# Patient Record
Sex: MALE | Race: WHITE | NOT HISPANIC OR LATINO
[De-identification: names, ages, dates, MRNs, and addresses within clinical notes are randomized per-mention and may not be internally consistent; named-entity substitution may affect disease eponyms.]

---

## 2017-02-13 ENCOUNTER — CLINICAL ADVICE (OUTPATIENT)
Age: 82
End: 2017-02-13

## 2017-04-12 ENCOUNTER — INPATIENT (INPATIENT)
Facility: HOSPITAL | Age: 82
LOS: 1 days | Discharge: HOME | End: 2017-04-14
Attending: HOSPITALIST | Admitting: HOSPITALIST
Payer: MEDICARE

## 2017-04-12 VITALS
TEMPERATURE: 98 F | HEART RATE: 92 BPM | DIASTOLIC BLOOD PRESSURE: 81 MMHG | OXYGEN SATURATION: 100 % | SYSTOLIC BLOOD PRESSURE: 138 MMHG | RESPIRATION RATE: 14 BRPM

## 2017-04-12 DIAGNOSIS — K62.5 HEMORRHAGE OF ANUS AND RECTUM: ICD-10-CM

## 2017-04-12 LAB
ALBUMIN SERPL ELPH-MCNC: 4.2 G/DL — SIGNIFICANT CHANGE UP (ref 3.3–5)
ALP SERPL-CCNC: 89 U/L — SIGNIFICANT CHANGE UP (ref 40–120)
ALT FLD-CCNC: 14 U/L — SIGNIFICANT CHANGE UP (ref 4–41)
APTT BLD: 49.4 SEC — HIGH (ref 27.5–37.4)
AST SERPL-CCNC: 27 U/L — SIGNIFICANT CHANGE UP (ref 4–40)
BASOPHILS # BLD AUTO: 0.04 K/UL — SIGNIFICANT CHANGE UP (ref 0–0.2)
BASOPHILS NFR BLD AUTO: 0.3 % — SIGNIFICANT CHANGE UP (ref 0–2)
BILIRUB SERPL-MCNC: 1.4 MG/DL — HIGH (ref 0.2–1.2)
BLD GP AB SCN SERPL QL: NEGATIVE — SIGNIFICANT CHANGE UP
BUN SERPL-MCNC: 21 MG/DL — SIGNIFICANT CHANGE UP (ref 7–23)
CALCIUM SERPL-MCNC: 9 MG/DL — SIGNIFICANT CHANGE UP (ref 8.4–10.5)
CHLORIDE SERPL-SCNC: 99 MMOL/L — SIGNIFICANT CHANGE UP (ref 98–107)
CO2 SERPL-SCNC: 24 MMOL/L — SIGNIFICANT CHANGE UP (ref 22–31)
CREAT SERPL-MCNC: 1.07 MG/DL — SIGNIFICANT CHANGE UP (ref 0.5–1.3)
EOSINOPHIL # BLD AUTO: 0.36 K/UL — SIGNIFICANT CHANGE UP (ref 0–0.5)
EOSINOPHIL NFR BLD AUTO: 2.8 % — SIGNIFICANT CHANGE UP (ref 0–6)
GLUCOSE SERPL-MCNC: 103 MG/DL — HIGH (ref 70–99)
HCT VFR BLD CALC: 45.9 % — SIGNIFICANT CHANGE UP (ref 39–50)
HGB BLD-MCNC: 15.9 G/DL — SIGNIFICANT CHANGE UP (ref 13–17)
IMM GRANULOCYTES NFR BLD AUTO: 0.4 % — SIGNIFICANT CHANGE UP (ref 0–1.5)
INR BLD: 3.78 — HIGH (ref 0.88–1.17)
LYMPHOCYTES # BLD AUTO: 25.3 % — SIGNIFICANT CHANGE UP (ref 13–44)
LYMPHOCYTES # BLD AUTO: 3.27 K/UL — SIGNIFICANT CHANGE UP (ref 1–3.3)
MCHC RBC-ENTMCNC: 31.8 PG — SIGNIFICANT CHANGE UP (ref 27–34)
MCHC RBC-ENTMCNC: 34.6 % — SIGNIFICANT CHANGE UP (ref 32–36)
MCV RBC AUTO: 91.8 FL — SIGNIFICANT CHANGE UP (ref 80–100)
MONOCYTES # BLD AUTO: 1.04 K/UL — HIGH (ref 0–0.9)
MONOCYTES NFR BLD AUTO: 8 % — SIGNIFICANT CHANGE UP (ref 2–14)
NEUTROPHILS # BLD AUTO: 8.19 K/UL — HIGH (ref 1.8–7.4)
NEUTROPHILS NFR BLD AUTO: 63.2 % — SIGNIFICANT CHANGE UP (ref 43–77)
OB PNL STL: POSITIVE — SIGNIFICANT CHANGE UP
PLATELET # BLD AUTO: 223 K/UL — SIGNIFICANT CHANGE UP (ref 150–400)
PMV BLD: 11 FL — SIGNIFICANT CHANGE UP (ref 7–13)
POTASSIUM SERPL-MCNC: 4.8 MMOL/L — SIGNIFICANT CHANGE UP (ref 3.5–5.3)
POTASSIUM SERPL-SCNC: 4.8 MMOL/L — SIGNIFICANT CHANGE UP (ref 3.5–5.3)
PROT SERPL-MCNC: 7.1 G/DL — SIGNIFICANT CHANGE UP (ref 6–8.3)
PROTHROM AB SERPL-ACNC: 43.5 SEC — HIGH (ref 9.8–13.1)
RBC # BLD: 5 M/UL — SIGNIFICANT CHANGE UP (ref 4.2–5.8)
RBC # FLD: 13.7 % — SIGNIFICANT CHANGE UP (ref 10.3–14.5)
RH IG SCN BLD-IMP: POSITIVE — SIGNIFICANT CHANGE UP
SODIUM SERPL-SCNC: 140 MMOL/L — SIGNIFICANT CHANGE UP (ref 135–145)
WBC # BLD: 12.95 K/UL — HIGH (ref 3.8–10.5)
WBC # FLD AUTO: 12.95 K/UL — HIGH (ref 3.8–10.5)

## 2017-04-12 RX ORDER — FINASTERIDE 5 MG/1
1 TABLET, FILM COATED ORAL
Qty: 0 | Refills: 0 | COMMUNITY

## 2017-04-12 RX ORDER — FINASTERIDE 5 MG/1
5 TABLET, FILM COATED ORAL ONCE
Qty: 0 | Refills: 0 | Status: COMPLETED | OUTPATIENT
Start: 2017-04-12 | End: 2017-04-12

## 2017-04-12 RX ORDER — METOPROLOL TARTRATE 50 MG
50 TABLET ORAL ONCE
Qty: 0 | Refills: 0 | Status: COMPLETED | OUTPATIENT
Start: 2017-04-12 | End: 2017-04-12

## 2017-04-12 RX ORDER — TAMSULOSIN HYDROCHLORIDE 0.4 MG/1
0.4 CAPSULE ORAL ONCE
Qty: 0 | Refills: 0 | Status: COMPLETED | OUTPATIENT
Start: 2017-04-12 | End: 2017-04-12

## 2017-04-12 RX ORDER — METHENAMINE MANDELATE 1 G
1 TABLET ORAL AT BEDTIME
Qty: 0 | Refills: 0 | Status: DISCONTINUED | OUTPATIENT
Start: 2017-04-12 | End: 2017-04-14

## 2017-04-12 RX ORDER — SIMVASTATIN 20 MG/1
40 TABLET, FILM COATED ORAL ONCE
Qty: 0 | Refills: 0 | Status: COMPLETED | OUTPATIENT
Start: 2017-04-12 | End: 2017-04-12

## 2017-04-12 RX ADMIN — Medication 1 GRAM(S): at 23:37

## 2017-04-12 NOTE — ED PROVIDER NOTE - OBJECTIVE STATEMENT
82M on coumadin for Afib, HTN, HL, hemmorhoids,  c/o abdominal cramps today with several slightly soft bowel movements. Initially noted some blood on toilet paper. Later noted fresh blood about a cup full in toilet with soft stool x2 this evening. no rectal pain, no cp nosob, no light headedness. Review of Symptoms in all systems otherwise negative, except as indicated  Meds:  Coumadin alternating daily dose: 4mg then 2mg then 2mg then 4mg then 2 mg then 2mg  Toprol XL 50mg  Flomax 4mg  Proscar 5mg  Hiprex 1mg 82M on coumadin for Afib, HTN, HL, hemmorhoids,  c/o abdominal cramps today with several slightly soft bowel movements. Initially noted some blood on toilet paper. Later noted fresh blood about a cup full in toilet with soft stool x2 this evening. no rectal pain, no cp nosob, no light headedness. Review of Symptoms in all systems otherwise negative, except as indicated  Meds:  Coumadin alternating daily dose: 4mg then 2mg then 2mg then 4mg then 2 mg then 2mg  Toprol XL 50mg  Flomax 4mg  Proscar 5mg  Hiprex 1mg    PCP DR Ward (in [facility]- not affiliated with [facility]) 82M on coumadin for Afib, HTN, HL, hemmorhoids,  c/o abdominal cramps today with several slightly soft bowel movements. Initially noted some blood on toilet paper. Later noted fresh blood about a cup full in toilet with soft stool x2 this evening. no rectal pain, no cp nosob, no light headedness. Review of Symptoms in all systems otherwise negative, except as indicated  Meds:  Coumadin alternating daily dose: 4mg then 2mg then 2mg then 4mg then 2 mg then 2mg taken in AM  Toprol XL 50mg BID  Flomax 4mg BID  Proscar 5mg HS  Hiprex 1mg HS    PCP DR Ward (in [facility]- not affiliated with [facility])

## 2017-04-12 NOTE — ED PROVIDER NOTE - PMH
AF (Atrial Fibrillation)  x 4-5 years  Enlarged prostate    HTN (hypertension)    Hypercholesterolemia

## 2017-04-12 NOTE — ED PROVIDER NOTE - PHYSICAL EXAMINATION
rectal done with nurse as chaperone  + non bleeding external hemmorhoids  No masses. + smoothly enlarged prostate  + fresh red blood on glove

## 2017-04-12 NOTE — ED PROVIDER NOTE - MEDICAL DECISION MAKING DETAILS
Rectal bleeding in 82M on Coumadin for a fib. Hemodynamically stable with unremarkable exam except + fresh blood on rectal exam. Plan: Labs PT INR H/H EKG T&S

## 2017-04-13 ENCOUNTER — TRANSCRIPTION ENCOUNTER (OUTPATIENT)
Age: 82
End: 2017-04-13

## 2017-04-13 DIAGNOSIS — N40.0 BENIGN PROSTATIC HYPERPLASIA WITHOUT LOWER URINARY TRACT SYMPTOMS: ICD-10-CM

## 2017-04-13 DIAGNOSIS — I48.91 UNSPECIFIED ATRIAL FIBRILLATION: ICD-10-CM

## 2017-04-13 DIAGNOSIS — I10 ESSENTIAL (PRIMARY) HYPERTENSION: ICD-10-CM

## 2017-04-13 DIAGNOSIS — E78.00 PURE HYPERCHOLESTEROLEMIA, UNSPECIFIED: ICD-10-CM

## 2017-04-13 DIAGNOSIS — K62.5 HEMORRHAGE OF ANUS AND RECTUM: ICD-10-CM

## 2017-04-13 LAB
ALBUMIN SERPL ELPH-MCNC: 3.7 G/DL — SIGNIFICANT CHANGE UP (ref 3.3–5)
ALP SERPL-CCNC: 77 U/L — SIGNIFICANT CHANGE UP (ref 40–120)
ALT FLD-CCNC: 9 U/L — SIGNIFICANT CHANGE UP (ref 4–41)
APPEARANCE UR: CLEAR — SIGNIFICANT CHANGE UP
AST SERPL-CCNC: 17 U/L — SIGNIFICANT CHANGE UP (ref 4–40)
BACTERIA # UR AUTO: SIGNIFICANT CHANGE UP
BASOPHILS # BLD AUTO: 0.03 K/UL — SIGNIFICANT CHANGE UP (ref 0–0.2)
BASOPHILS NFR BLD AUTO: 0.3 % — SIGNIFICANT CHANGE UP (ref 0–2)
BILIRUB SERPL-MCNC: 1.4 MG/DL — HIGH (ref 0.2–1.2)
BILIRUB UR-MCNC: NEGATIVE — SIGNIFICANT CHANGE UP
BLOOD UR QL VISUAL: HIGH
BUN SERPL-MCNC: 18 MG/DL — SIGNIFICANT CHANGE UP (ref 7–23)
CALCIUM SERPL-MCNC: 8.8 MG/DL — SIGNIFICANT CHANGE UP (ref 8.4–10.5)
CHLORIDE SERPL-SCNC: 101 MMOL/L — SIGNIFICANT CHANGE UP (ref 98–107)
CO2 SERPL-SCNC: 25 MMOL/L — SIGNIFICANT CHANGE UP (ref 22–31)
COLOR SPEC: SIGNIFICANT CHANGE UP
CREAT SERPL-MCNC: 0.87 MG/DL — SIGNIFICANT CHANGE UP (ref 0.5–1.3)
EOSINOPHIL # BLD AUTO: 0.31 K/UL — SIGNIFICANT CHANGE UP (ref 0–0.5)
EOSINOPHIL NFR BLD AUTO: 2.9 % — SIGNIFICANT CHANGE UP (ref 0–6)
GLUCOSE SERPL-MCNC: 91 MG/DL — SIGNIFICANT CHANGE UP (ref 70–99)
GLUCOSE UR-MCNC: NEGATIVE — SIGNIFICANT CHANGE UP
HCT VFR BLD CALC: 45.1 % — SIGNIFICANT CHANGE UP (ref 39–50)
HGB BLD-MCNC: 15.3 G/DL — SIGNIFICANT CHANGE UP (ref 13–17)
IMM GRANULOCYTES NFR BLD AUTO: 0.6 % — SIGNIFICANT CHANGE UP (ref 0–1.5)
INR BLD: 3.71 — HIGH (ref 0.88–1.17)
KETONES UR-MCNC: NEGATIVE — SIGNIFICANT CHANGE UP
LEUKOCYTE ESTERASE UR-ACNC: HIGH
LYMPHOCYTES # BLD AUTO: 3.37 K/UL — HIGH (ref 1–3.3)
LYMPHOCYTES # BLD AUTO: 32.1 % — SIGNIFICANT CHANGE UP (ref 13–44)
MAGNESIUM SERPL-MCNC: 1.7 MG/DL — SIGNIFICANT CHANGE UP (ref 1.6–2.6)
MCHC RBC-ENTMCNC: 31 PG — SIGNIFICANT CHANGE UP (ref 27–34)
MCHC RBC-ENTMCNC: 33.9 % — SIGNIFICANT CHANGE UP (ref 32–36)
MCV RBC AUTO: 91.5 FL — SIGNIFICANT CHANGE UP (ref 80–100)
MONOCYTES # BLD AUTO: 1.12 K/UL — HIGH (ref 0–0.9)
MONOCYTES NFR BLD AUTO: 10.7 % — SIGNIFICANT CHANGE UP (ref 2–14)
MUCOUS THREADS # UR AUTO: SIGNIFICANT CHANGE UP
NEUTROPHILS # BLD AUTO: 5.62 K/UL — SIGNIFICANT CHANGE UP (ref 1.8–7.4)
NEUTROPHILS NFR BLD AUTO: 53.4 % — SIGNIFICANT CHANGE UP (ref 43–77)
NITRITE UR-MCNC: NEGATIVE — SIGNIFICANT CHANGE UP
NON-SQ EPI CELLS # UR AUTO: <1 — SIGNIFICANT CHANGE UP
PH UR: 6 — SIGNIFICANT CHANGE UP (ref 4.6–8)
PHOSPHATE SERPL-MCNC: 2.6 MG/DL — SIGNIFICANT CHANGE UP (ref 2.5–4.5)
PLATELET # BLD AUTO: 206 K/UL — SIGNIFICANT CHANGE UP (ref 150–400)
PMV BLD: 11 FL — SIGNIFICANT CHANGE UP (ref 7–13)
POTASSIUM SERPL-MCNC: 4.2 MMOL/L — SIGNIFICANT CHANGE UP (ref 3.5–5.3)
POTASSIUM SERPL-SCNC: 4.2 MMOL/L — SIGNIFICANT CHANGE UP (ref 3.5–5.3)
PROT SERPL-MCNC: 6.1 G/DL — SIGNIFICANT CHANGE UP (ref 6–8.3)
PROT UR-MCNC: 20 — SIGNIFICANT CHANGE UP
PROTHROM AB SERPL-ACNC: 42.7 SEC — HIGH (ref 9.8–13.1)
RBC # BLD: 4.93 M/UL — SIGNIFICANT CHANGE UP (ref 4.2–5.8)
RBC # FLD: 13.8 % — SIGNIFICANT CHANGE UP (ref 10.3–14.5)
RBC CASTS # UR COMP ASSIST: SIGNIFICANT CHANGE UP (ref 0–?)
SODIUM SERPL-SCNC: 140 MMOL/L — SIGNIFICANT CHANGE UP (ref 135–145)
SP GR SPEC: 1.01 — SIGNIFICANT CHANGE UP (ref 1–1.03)
SQUAMOUS # UR AUTO: SIGNIFICANT CHANGE UP
UROBILINOGEN FLD QL: NORMAL E.U. — SIGNIFICANT CHANGE UP (ref 0.1–0.2)
WBC # BLD: 10.51 K/UL — HIGH (ref 3.8–10.5)
WBC # FLD AUTO: 10.51 K/UL — HIGH (ref 3.8–10.5)
WBC CLUMPS #/AREA URNS HPF: PRESENT — HIGH (ref 0–?)
WBC UR QL: SIGNIFICANT CHANGE UP (ref 0–?)

## 2017-04-13 PROCEDURE — 99222 1ST HOSP IP/OBS MODERATE 55: CPT | Mod: GC

## 2017-04-13 PROCEDURE — 99223 1ST HOSP IP/OBS HIGH 75: CPT

## 2017-04-13 RX ORDER — ASCORBIC ACID 60 MG
500 TABLET,CHEWABLE ORAL AT BEDTIME
Qty: 0 | Refills: 0 | Status: DISCONTINUED | OUTPATIENT
Start: 2017-04-13 | End: 2017-04-14

## 2017-04-13 RX ORDER — METHENAMINE MANDELATE 1 G
1 TABLET ORAL
Qty: 0 | Refills: 0 | COMMUNITY

## 2017-04-13 RX ORDER — TAMSULOSIN HYDROCHLORIDE 0.4 MG/1
0.4 CAPSULE ORAL AT BEDTIME
Qty: 0 | Refills: 0 | Status: DISCONTINUED | OUTPATIENT
Start: 2017-04-13 | End: 2017-04-14

## 2017-04-13 RX ORDER — METOPROLOL TARTRATE 50 MG
50 TABLET ORAL DAILY
Qty: 0 | Refills: 0 | Status: DISCONTINUED | OUTPATIENT
Start: 2017-04-13 | End: 2017-04-14

## 2017-04-13 RX ORDER — PHYTONADIONE (VIT K1) 5 MG
5 TABLET ORAL ONCE
Qty: 0 | Refills: 0 | Status: DISCONTINUED | OUTPATIENT
Start: 2017-04-13 | End: 2017-04-13

## 2017-04-13 RX ORDER — ASCORBIC ACID 60 MG
500 TABLET,CHEWABLE ORAL DAILY
Qty: 0 | Refills: 0 | Status: DISCONTINUED | OUTPATIENT
Start: 2017-04-13 | End: 2017-04-13

## 2017-04-13 RX ORDER — METOPROLOL TARTRATE 50 MG
1 TABLET ORAL
Qty: 0 | Refills: 0 | COMMUNITY

## 2017-04-13 RX ORDER — SIMVASTATIN 20 MG/1
1 TABLET, FILM COATED ORAL
Qty: 0 | Refills: 0 | COMMUNITY

## 2017-04-13 RX ORDER — PHYTONADIONE (VIT K1) 5 MG
10 TABLET ORAL ONCE
Qty: 0 | Refills: 0 | Status: COMPLETED | OUTPATIENT
Start: 2017-04-13 | End: 2017-04-13

## 2017-04-13 RX ORDER — FINASTERIDE 5 MG/1
5 TABLET, FILM COATED ORAL DAILY
Qty: 0 | Refills: 0 | Status: DISCONTINUED | OUTPATIENT
Start: 2017-04-13 | End: 2017-04-14

## 2017-04-13 RX ORDER — TAMSULOSIN HYDROCHLORIDE 0.4 MG/1
1 CAPSULE ORAL
Qty: 0 | Refills: 0 | COMMUNITY

## 2017-04-13 RX ORDER — SOD SULF/SODIUM/NAHCO3/KCL/PEG
1000 SOLUTION, RECONSTITUTED, ORAL ORAL EVERY 4 HOURS
Qty: 0 | Refills: 0 | Status: COMPLETED | OUTPATIENT
Start: 2017-04-13 | End: 2017-04-13

## 2017-04-13 RX ORDER — ASCORBIC ACID 60 MG
1 TABLET,CHEWABLE ORAL
Qty: 0 | Refills: 0 | COMMUNITY

## 2017-04-13 RX ORDER — FINASTERIDE 5 MG/1
1 TABLET, FILM COATED ORAL
Qty: 0 | Refills: 0 | COMMUNITY

## 2017-04-13 RX ORDER — SIMVASTATIN 20 MG/1
40 TABLET, FILM COATED ORAL AT BEDTIME
Qty: 0 | Refills: 0 | Status: DISCONTINUED | OUTPATIENT
Start: 2017-04-13 | End: 2017-04-14

## 2017-04-13 RX ORDER — SODIUM CHLORIDE 9 MG/ML
1000 INJECTION INTRAMUSCULAR; INTRAVENOUS; SUBCUTANEOUS
Qty: 0 | Refills: 0 | Status: DISCONTINUED | OUTPATIENT
Start: 2017-04-13 | End: 2017-04-14

## 2017-04-13 RX ADMIN — Medication 1 GRAM(S): at 20:49

## 2017-04-13 RX ADMIN — Medication 50 MILLIGRAM(S): at 07:01

## 2017-04-13 RX ADMIN — Medication 10 MILLIGRAM(S): at 18:27

## 2017-04-13 RX ADMIN — TAMSULOSIN HYDROCHLORIDE 0.4 MILLIGRAM(S): 0.4 CAPSULE ORAL at 09:22

## 2017-04-13 RX ADMIN — SODIUM CHLORIDE 100 MILLILITER(S): 9 INJECTION INTRAMUSCULAR; INTRAVENOUS; SUBCUTANEOUS at 20:50

## 2017-04-13 RX ADMIN — Medication 1000 MILLILITER(S): at 22:11

## 2017-04-13 RX ADMIN — Medication 10 MILLIGRAM(S): at 22:09

## 2017-04-13 RX ADMIN — SIMVASTATIN 40 MILLIGRAM(S): 20 TABLET, FILM COATED ORAL at 00:47

## 2017-04-13 RX ADMIN — FINASTERIDE 5 MILLIGRAM(S): 5 TABLET, FILM COATED ORAL at 22:10

## 2017-04-13 RX ADMIN — Medication 1000 MILLILITER(S): at 18:27

## 2017-04-13 RX ADMIN — Medication 50 MILLIGRAM(S): at 00:47

## 2017-04-13 RX ADMIN — FINASTERIDE 5 MILLIGRAM(S): 5 TABLET, FILM COATED ORAL at 00:47

## 2017-04-13 RX ADMIN — Medication 500 MILLIGRAM(S): at 20:51

## 2017-04-13 RX ADMIN — Medication 10 MILLIGRAM(S): at 14:19

## 2017-04-13 RX ADMIN — SODIUM CHLORIDE 100 MILLILITER(S): 9 INJECTION INTRAMUSCULAR; INTRAVENOUS; SUBCUTANEOUS at 04:17

## 2017-04-13 RX ADMIN — SIMVASTATIN 40 MILLIGRAM(S): 20 TABLET, FILM COATED ORAL at 22:10

## 2017-04-13 RX ADMIN — TAMSULOSIN HYDROCHLORIDE 0.4 MILLIGRAM(S): 0.4 CAPSULE ORAL at 00:47

## 2017-04-13 NOTE — PHYSICAL THERAPY INITIAL EVALUATION ADULT - PERTINENT HX OF CURRENT PROBLEM, REHAB EVAL
81 yo M with h/o HTN, A fib on coumadin, HLD p/w BRBPR. Pt states that today he had two episodes of BBPR. He says he had the sensation of " cramping or gas" and when attempting a bowel movement, noticed blood- without any stool

## 2017-04-13 NOTE — DISCHARGE NOTE ADULT - CARE PLAN
Principal Discharge DX:	Rectal bleeding  Goal:	no further bleeding, stable H/H  Secondary Diagnosis:	Atrial fibrillation, unspecified type  Goal:	rate control  Secondary Diagnosis:	HTN (hypertension)  Goal:	BP control Principal Discharge DX:	Rectal bleeding  Goal:	no further bleeding, stable H/H  Instructions for follow-up, activity and diet:	Colonoscopy showed Colonoscopy showed Non-thrombosed external hemorrhoids found on perianal exam with superficial ulcer. Diverticulosis in the entire examined colon- severe and extensive pancolonic diverticulosis is noted. Multiple non-bleeding polyps in the rectum, in the transverse colon, in the ascending colon and in the cecum- largest polyps are a 20mm x 6mm cecal polyp and a 10mm TC polyp. All non-bleeding and benign appearing. No active bleeding   You will need to Repeat colonoscopy in 6 months off of anticoagulation for removal of multiple polyps.  Secondary Diagnosis:	Atrial fibrillation, unspecified type  Goal:	rate control  Instructions for follow-up, activity and diet:	INR on discharge was 2.07, please continue daily Coumadin and follow up with your PCP in 3-4 days to repeat INR  Secondary Diagnosis:	HTN (hypertension)  Goal:	BP control Principal Discharge DX:	Rectal bleeding  Goal:	no further bleeding, stable H/H  Instructions for follow-up, activity and diet:	Colonoscopy showed Colonoscopy showed Non-thrombosed external hemorrhoids found on perianal exam with superficial ulcer. Diverticulosis in the entire examined colon- severe and extensive pancolonic diverticulosis is noted. Multiple non-bleeding polyps in the rectum, in the transverse colon, in the ascending colon and in the cecum- largest polyps are a 20mm x 6mm cecal polyp and a 10mm TC polyp. All non-bleeding and benign appearing. No active bleeding   You will need to Repeat colonoscopy in 6 months off of anticoagulation for removal of multiple polyps.  Secondary Diagnosis:	Atrial fibrillation, unspecified type  Goal:	rate control  Instructions for follow-up, activity and diet:	INR on discharge was 2.07, please continue daily Coumadin and follow up with your PCP in 3-4 days to repeat INR  Secondary Diagnosis:	HTN (hypertension)  Goal:	BP control  Instructions for follow-up, activity and diet:	Continue Metoprolol

## 2017-04-13 NOTE — DISCHARGE NOTE ADULT - PLAN OF CARE
no further bleeding, stable H/H rate control BP control Colonoscopy showed Colonoscopy showed Non-thrombosed external hemorrhoids found on perianal exam with superficial ulcer. Diverticulosis in the entire examined colon- severe and extensive pancolonic diverticulosis is noted. Multiple non-bleeding polyps in the rectum, in the transverse colon, in the ascending colon and in the cecum- largest polyps are a 20mm x 6mm cecal polyp and a 10mm TC polyp. All non-bleeding and benign appearing. No active bleeding   You will need to Repeat colonoscopy in 6 months off of anticoagulation for removal of multiple polyps. INR on discharge was 2.07, please continue daily Coumadin and follow up with your PCP in 3-4 days to repeat INR Continue Metoprolol

## 2017-04-13 NOTE — DISCHARGE NOTE ADULT - CARE PROVIDER_API CALL
Robert Carbajal (MD), Cardiac Electrophysiology; Cardiovascular Disease; Internal Medicine  [address] [address] [place] [id number]  Phone: [phone]  Fax: [phone]

## 2017-04-13 NOTE — DISCHARGE NOTE ADULT - HOSPITAL COURSE
81 yo M with h/o HTN, A fib on coumadin, HLD p/w BRBPR. Pt states that today he had two episodes of BBPR. He says he had the sensation of " cramping or gas" and when attempting a bowel movement, noticed blood- without any stool. CBC done Q6 h, H/H remained stable 83 yo M with h/o HTN, A fib on coumadin, HLD p/w BRBPR. Pt states that today he had two episodes of BBPR. He says he had the sensation of " cramping or gas" and when attempting a bowel movement, noticed blood- without any stool. CBC done Q6 h, H/H remained stable. Colonoscopy showed Non-thrombosed external hemorrhoids found on perianal exam with superficial ulcer. Diverticulosis in the entire examined colon- severe   and extensive pancolonic diverticulosis is noted. Multiple non-bleeding polyps in the rectum, in the transverse colon, in the ascending colon and in the cecum- largest polyps are a 20mm x 6mm cecal polyp and a 10mm TC polyp. All non-bleeding and benign appearing. No active bleeding or old blood was noted on this exam. Hematochezia was most likely secondary to hemorrhoids. Pt will need to Repeat colonoscopy in 6 months off of anticoagulation for removal of multiple polyps. 83 yo M with h/o HTN, A fib on coumadin, HLD p/w BRBPR. Pt states that today he had two episodes of BBPR. He says he had the sensation of " cramping or gas" and when attempting a bowel movement, noticed blood- without any stool. CBC done Q6 h, H/H remained stable. Colonoscopy showed Non-thrombosed external hemorrhoids found on perianal exam with superficial ulcer. Diverticulosis in the entire examined colon- severe   and extensive pancolonic diverticulosis is noted. Multiple non-bleeding polyps in the rectum, in the transverse colon, in the ascending colon and in the cecum- largest polyps are a 20mm x 6mm cecal polyp and a 10mm TC polyp. All non-bleeding and benign appearing. No active bleeding or old blood was noted on this exam. Hematochezia was most likely secondary to hemorrhoids. Pt will need to Repeat colonoscopy in 6 months off of anticoagulation for removal of multiple polyps.  Pt is optimized for discharge

## 2017-04-13 NOTE — H&P ADULT. - HISTORY OF PRESENT ILLNESS
83 yo M with h/o HTN, A fib on coumadin, HLD p/w BRBPR. Pt states that today he had two episodes of BBPR. He says he had the sensation of " cramping or gas" and when attempting a bowel movement, noticed blood- without any stool . He's unable to quantify the amount of blood. He denies any lightheadedness, he has not had any N/V, has not had any further BMs since then.      Initial ED VS: 138/81  97.7  14  100% RA

## 2017-04-13 NOTE — DISCHARGE NOTE ADULT - PATIENT PORTAL LINK FT
“You can access the [facility] Patient Portal, offered by [facility], by registering with the following website: [email or web address]”

## 2017-04-14 VITALS
OXYGEN SATURATION: 99 % | HEART RATE: 63 BPM | RESPIRATION RATE: 19 BRPM | TEMPERATURE: 98 F | SYSTOLIC BLOOD PRESSURE: 155 MMHG | DIASTOLIC BLOOD PRESSURE: 90 MMHG

## 2017-04-14 LAB
ALBUMIN SERPL ELPH-MCNC: 3.6 G/DL — SIGNIFICANT CHANGE UP (ref 3.3–5)
ALP SERPL-CCNC: 69 U/L — SIGNIFICANT CHANGE UP (ref 40–120)
ALT FLD-CCNC: 11 U/L — SIGNIFICANT CHANGE UP (ref 4–41)
APTT BLD: 39.7 SEC — HIGH (ref 27.5–37.4)
AST SERPL-CCNC: 18 U/L — SIGNIFICANT CHANGE UP (ref 4–40)
BACTERIA UR CULT: SIGNIFICANT CHANGE UP
BASOPHILS # BLD AUTO: 0.02 K/UL — SIGNIFICANT CHANGE UP (ref 0–0.2)
BASOPHILS NFR BLD AUTO: 0.2 % — SIGNIFICANT CHANGE UP (ref 0–2)
BILIRUB SERPL-MCNC: 1.5 MG/DL — HIGH (ref 0.2–1.2)
BUN SERPL-MCNC: 13 MG/DL — SIGNIFICANT CHANGE UP (ref 7–23)
CALCIUM SERPL-MCNC: 8.4 MG/DL — SIGNIFICANT CHANGE UP (ref 8.4–10.5)
CHLORIDE SERPL-SCNC: 111 MMOL/L — HIGH (ref 98–107)
CO2 SERPL-SCNC: 19 MMOL/L — LOW (ref 22–31)
CREAT SERPL-MCNC: 0.91 MG/DL — SIGNIFICANT CHANGE UP (ref 0.5–1.3)
EOSINOPHIL # BLD AUTO: 0.32 K/UL — SIGNIFICANT CHANGE UP (ref 0–0.5)
EOSINOPHIL NFR BLD AUTO: 3.9 % — SIGNIFICANT CHANGE UP (ref 0–6)
GLUCOSE SERPL-MCNC: 90 MG/DL — SIGNIFICANT CHANGE UP (ref 70–99)
HCT VFR BLD CALC: 44.4 % — SIGNIFICANT CHANGE UP (ref 39–50)
HGB BLD-MCNC: 14.9 G/DL — SIGNIFICANT CHANGE UP (ref 13–17)
IMM GRANULOCYTES NFR BLD AUTO: 0.9 % — SIGNIFICANT CHANGE UP (ref 0–1.5)
INR BLD: 2.07 — HIGH (ref 0.88–1.17)
LYMPHOCYTES # BLD AUTO: 2.66 K/UL — SIGNIFICANT CHANGE UP (ref 1–3.3)
LYMPHOCYTES # BLD AUTO: 32.5 % — SIGNIFICANT CHANGE UP (ref 13–44)
MCHC RBC-ENTMCNC: 31 PG — SIGNIFICANT CHANGE UP (ref 27–34)
MCHC RBC-ENTMCNC: 33.6 % — SIGNIFICANT CHANGE UP (ref 32–36)
MCV RBC AUTO: 92.5 FL — SIGNIFICANT CHANGE UP (ref 80–100)
MONOCYTES # BLD AUTO: 0.93 K/UL — HIGH (ref 0–0.9)
MONOCYTES NFR BLD AUTO: 11.4 % — SIGNIFICANT CHANGE UP (ref 2–14)
NEUTROPHILS # BLD AUTO: 4.18 K/UL — SIGNIFICANT CHANGE UP (ref 1.8–7.4)
NEUTROPHILS NFR BLD AUTO: 51.1 % — SIGNIFICANT CHANGE UP (ref 43–77)
PLATELET # BLD AUTO: 180 K/UL — SIGNIFICANT CHANGE UP (ref 150–400)
PMV BLD: 10.6 FL — SIGNIFICANT CHANGE UP (ref 7–13)
POTASSIUM SERPL-MCNC: 4.3 MMOL/L — SIGNIFICANT CHANGE UP (ref 3.5–5.3)
POTASSIUM SERPL-SCNC: 4.3 MMOL/L — SIGNIFICANT CHANGE UP (ref 3.5–5.3)
PROT SERPL-MCNC: 5.9 G/DL — LOW (ref 6–8.3)
PROTHROM AB SERPL-ACNC: 23.5 SEC — HIGH (ref 9.8–13.1)
RBC # BLD: 4.8 M/UL — SIGNIFICANT CHANGE UP (ref 4.2–5.8)
RBC # FLD: 13.7 % — SIGNIFICANT CHANGE UP (ref 10.3–14.5)
SODIUM SERPL-SCNC: 145 MMOL/L — SIGNIFICANT CHANGE UP (ref 135–145)
SPECIMEN SOURCE: SIGNIFICANT CHANGE UP
WBC # BLD: 8.18 K/UL — SIGNIFICANT CHANGE UP (ref 3.8–10.5)
WBC # FLD AUTO: 8.18 K/UL — SIGNIFICANT CHANGE UP (ref 3.8–10.5)

## 2017-04-14 PROCEDURE — 45378 DIAGNOSTIC COLONOSCOPY: CPT | Mod: GC

## 2017-04-14 PROCEDURE — 99239 HOSP IP/OBS DSCHRG MGMT >30: CPT

## 2017-04-14 RX ORDER — WARFARIN SODIUM 2.5 MG/1
1 TABLET ORAL
Qty: 0 | Refills: 0 | COMMUNITY

## 2017-04-14 RX ADMIN — Medication 50 MILLIGRAM(S): at 05:49

## 2017-04-18 ENCOUNTER — APPOINTMENT (OUTPATIENT)
Dept: ELECTROPHYSIOLOGY | Facility: CLINIC | Age: 82
End: 2017-04-18

## 2017-04-18 ENCOUNTER — NON-APPOINTMENT (OUTPATIENT)
Age: 82
End: 2017-04-18

## 2017-04-18 VITALS
HEIGHT: 72 IN | DIASTOLIC BLOOD PRESSURE: 89 MMHG | OXYGEN SATURATION: 98 % | SYSTOLIC BLOOD PRESSURE: 153 MMHG | RESPIRATION RATE: 16 BRPM | BODY MASS INDEX: 21.13 KG/M2 | HEART RATE: 89 BPM | WEIGHT: 156 LBS

## 2017-07-05 ENCOUNTER — APPOINTMENT (OUTPATIENT)
Dept: GASTROENTEROLOGY | Facility: CLINIC | Age: 82
End: 2017-07-05

## 2017-07-05 VITALS
SYSTOLIC BLOOD PRESSURE: 146 MMHG | HEIGHT: 72 IN | TEMPERATURE: 97.7 F | HEART RATE: 74 BPM | DIASTOLIC BLOOD PRESSURE: 82 MMHG | WEIGHT: 154 LBS | OXYGEN SATURATION: 96 % | RESPIRATION RATE: 16 BRPM | BODY MASS INDEX: 20.86 KG/M2

## 2017-09-05 ENCOUNTER — OTHER (OUTPATIENT)
Age: 82
End: 2017-09-05

## 2017-10-03 ENCOUNTER — APPOINTMENT (OUTPATIENT)
Dept: ELECTROPHYSIOLOGY | Facility: CLINIC | Age: 82
End: 2017-10-03
Payer: MEDICARE

## 2017-10-03 VITALS
HEIGHT: 72 IN | WEIGHT: 153 LBS | SYSTOLIC BLOOD PRESSURE: 134 MMHG | BODY MASS INDEX: 20.72 KG/M2 | OXYGEN SATURATION: 95 % | HEART RATE: 76 BPM | DIASTOLIC BLOOD PRESSURE: 86 MMHG

## 2017-10-03 DIAGNOSIS — K63.5 POLYP OF COLON: ICD-10-CM

## 2017-10-03 PROCEDURE — 99214 OFFICE O/P EST MOD 30 MIN: CPT

## 2017-10-03 PROCEDURE — 93000 ELECTROCARDIOGRAM COMPLETE: CPT

## 2017-10-03 RX ORDER — CEFDINIR 300 MG/1
300 CAPSULE ORAL
Qty: 20 | Refills: 0 | Status: DISCONTINUED | COMMUNITY
Start: 2017-06-28

## 2017-10-03 RX ORDER — ALFUZOSIN HYDROCHLORIDE 10 MG/1
10 TABLET, EXTENDED RELEASE ORAL
Qty: 90 | Refills: 0 | Status: DISCONTINUED | COMMUNITY
Start: 2017-06-15 | End: 2017-10-03

## 2017-10-09 ENCOUNTER — RESULT REVIEW (OUTPATIENT)
Age: 82
End: 2017-10-09

## 2017-10-09 ENCOUNTER — APPOINTMENT (OUTPATIENT)
Dept: GASTROENTEROLOGY | Facility: HOSPITAL | Age: 82
End: 2017-10-09

## 2017-10-09 ENCOUNTER — OUTPATIENT (OUTPATIENT)
Dept: OUTPATIENT SERVICES | Facility: HOSPITAL | Age: 82
LOS: 1 days | Discharge: HOME | End: 2017-10-09
Payer: MEDICARE

## 2017-10-09 DIAGNOSIS — K63.5 POLYP OF COLON: ICD-10-CM

## 2017-10-09 PROCEDURE — 88305 TISSUE EXAM BY PATHOLOGIST: CPT | Mod: 26

## 2017-10-09 PROCEDURE — 45385 COLONOSCOPY W/LESION REMOVAL: CPT | Mod: GC

## 2017-10-10 LAB — SURGICAL PATHOLOGY STUDY: SIGNIFICANT CHANGE UP

## 2017-12-24 NOTE — DISCHARGE NOTE ADULT - MEDICATION SUMMARY - MEDICATIONS TO TAKE
ABDOMINAL DISTENSION/PAIN/NAUSEA I will START or STAY ON the medications listed below when I get home from the hospital:    finasteride 5 mg oral tablet  -- 1 tab(s) by mouth once a day  -- Indication: For Benign prostatic hyperplasia    Flomax 0.4 mg oral capsule  -- 1 cap(s) by mouth once a day  -- Indication: For Benign prostatic hyperplasia    warfarin 4 mg oral tablet  -- 1 tab once a day, alternate with 0.5 tab as per routine schedule (4mg, 2mg, 2mg, repeat)    -- Indication: For Atrial fibrillation, unspecified type    simvastatin 40 mg oral tablet  -- 1 tab(s) by mouth once a day (at bedtime)  -- Indication: For Hypercholesterolemia    metoprolol succinate 50 mg oral tablet, extended release  -- 1 tab(s) by mouth once a day  -- Indication: For HTN (hypertension)    Hiprex 1 g oral tablet  -- 1 tab(s) by mouth   -- Indication: For prophylaxis     Vitamin C 500 mg oral tablet  -- 1 tab(s) by mouth once a day  -- Indication: For prophylaxis

## 2018-03-02 ENCOUNTER — MESSAGE (OUTPATIENT)
Age: 83
End: 2018-03-02

## 2018-03-26 ENCOUNTER — MESSAGE (OUTPATIENT)
Age: 83
End: 2018-03-26

## 2018-05-15 ENCOUNTER — APPOINTMENT (OUTPATIENT)
Dept: ELECTROPHYSIOLOGY | Facility: CLINIC | Age: 83
End: 2018-05-15
Payer: MEDICARE

## 2018-05-15 ENCOUNTER — NON-APPOINTMENT (OUTPATIENT)
Age: 83
End: 2018-05-15

## 2018-05-15 VITALS
SYSTOLIC BLOOD PRESSURE: 153 MMHG | BODY MASS INDEX: 20.59 KG/M2 | HEART RATE: 98 BPM | DIASTOLIC BLOOD PRESSURE: 90 MMHG | OXYGEN SATURATION: 97 % | WEIGHT: 152 LBS | HEIGHT: 72 IN

## 2018-05-15 DIAGNOSIS — R31.9 HEMATURIA, UNSPECIFIED: ICD-10-CM

## 2018-05-15 PROCEDURE — 99214 OFFICE O/P EST MOD 30 MIN: CPT

## 2018-05-15 PROCEDURE — 93000 ELECTROCARDIOGRAM COMPLETE: CPT

## 2018-05-15 RX ORDER — POLYETHYLENE GLYCOL 3350, SODIUM SULFATE, SODIUM CHLORIDE, POTASSIUM CHLORIDE, ASCORBIC ACID, SODIUM ASCORBATE 7.5-2.691G
100 KIT ORAL
Qty: 1 | Refills: 0 | Status: DISCONTINUED | COMMUNITY
Start: 2017-07-05 | End: 2018-05-15

## 2018-05-15 RX ORDER — TAMSULOSIN HYDROCHLORIDE 0.4 MG/1
0.4 CAPSULE ORAL DAILY
Refills: 0 | Status: DISCONTINUED | COMMUNITY
Start: 2017-10-03 | End: 2018-05-15

## 2018-11-13 ENCOUNTER — APPOINTMENT (OUTPATIENT)
Dept: ELECTROPHYSIOLOGY | Facility: CLINIC | Age: 83
End: 2018-11-13
Payer: MEDICARE

## 2018-11-13 ENCOUNTER — NON-APPOINTMENT (OUTPATIENT)
Age: 83
End: 2018-11-13

## 2018-11-13 VITALS
HEIGHT: 72 IN | SYSTOLIC BLOOD PRESSURE: 133 MMHG | BODY MASS INDEX: 20.99 KG/M2 | OXYGEN SATURATION: 95 % | HEART RATE: 95 BPM | DIASTOLIC BLOOD PRESSURE: 79 MMHG | WEIGHT: 155 LBS

## 2018-11-13 PROCEDURE — 99213 OFFICE O/P EST LOW 20 MIN: CPT

## 2018-11-13 PROCEDURE — 93000 ELECTROCARDIOGRAM COMPLETE: CPT

## 2018-11-13 NOTE — PHYSICAL EXAM
[General Appearance - Well Developed] : well developed [Normal Appearance] : normal appearance [Well Groomed] : well groomed [General Appearance - Well Nourished] : well nourished [No Deformities] : no deformities [General Appearance - In No Acute Distress] : no acute distress [Normal Conjunctiva] : the conjunctiva exhibited no abnormalities [Eyelids - No Xanthelasma] : the eyelids demonstrated no xanthelasmas [Normal Oral Mucosa] : normal oral mucosa [No Oral Pallor] : no oral pallor [No Oral Cyanosis] : no oral cyanosis [Normal Jugular Venous A Waves Present] : normal jugular venous A waves present [Normal Jugular Venous V Waves Present] : normal jugular venous V waves present [No Jugular Venous Kumar A Waves] : no jugular venous kumar A waves [Respiration, Rhythm And Depth] : normal respiratory rhythm and effort [Exaggerated Use Of Accessory Muscles For Inspiration] : no accessory muscle use [Heart Sounds] : normal S1 and S2 [Murmurs] : no murmurs present [Abdomen Soft] : soft [Abdomen Tenderness] : non-tender [Abdomen Mass (___ Cm)] : no abdominal mass palpated [Abnormal Walk] : normal gait [Gait - Sufficient For Exercise Testing] : the gait was sufficient for exercise testing [Nail Clubbing] : no clubbing of the fingernails [Cyanosis, Localized] : no localized cyanosis [Petechial Hemorrhages (___cm)] : no petechial hemorrhages [Skin Color & Pigmentation] : normal skin color and pigmentation [] : no rash [No Venous Stasis] : no venous stasis [Skin Lesions] : no skin lesions [No Skin Ulcers] : no skin ulcer [No Xanthoma] : no  xanthoma was observed [Oriented To Time, Place, And Person] : oriented to person, place, and time [Affect] : the affect was normal [Mood] : the mood was normal [No Anxiety] : not feeling anxious [FreeTextEntry1] : irregular rate/rhythm

## 2018-11-13 NOTE — DISCUSSION/SUMMARY
[FreeTextEntry1] : In summary, Nestor Kang is an 82y/o man with Hx of possible TIA, HTN, HLD, COPD, recurring hematuria followed by Urology and GIB with noted diverticulosis/polyps s/p removal, and chronic atrial fibrillation maintained on Coumadin who presents today for routine f/u. Admits doing well with no issues or complaints. No recent hematuria or bleeding. Denies chest pain, palpitations, SOB, syncope or near syncope. Have discussed possibility of undergoing Watchman procedure in past but refuses at this time. Will continue current medications as prescribed, regular f/u with PCP, and RTO for f/u in 6 months. \par \par Sincerely,\par \par Robert Carbajal MD

## 2018-11-13 NOTE — HISTORY OF PRESENT ILLNESS
Encounter Date: 9/28/2018       History     Chief Complaint   Patient presents with    food stuck in throat     26-year-old male complains inability to swallow following an ingestion of a piece of chicken last night  He states approximately 3-4 prior occurrences over the years but none that has prompted EGD or treatment of food bolus - generally he is able to swallow additional liquids or regurgitate.    Generally healthy    Last intake was last night          Review of patient's allergies indicates:  No Known Allergies  History reviewed. No pertinent past medical history.  Past Surgical History:   Procedure Laterality Date    KNEE ARTHROSCOPY W/ ACL RECONSTRUCTION       History reviewed. No pertinent family history.  Social History     Tobacco Use    Smoking status: Current Some Day Smoker    Smokeless tobacco: Current User     Types: Chew   Substance Use Topics    Alcohol use: Yes     Comment: occ    Drug use: No     Review of Systems   Constitutional: Negative.    HENT: Positive for trouble swallowing.    Respiratory: Negative.    Cardiovascular: Negative.    Gastrointestinal: Negative.    Musculoskeletal: Negative.    Skin: Negative.    Hematological: Negative.    All other systems reviewed and are negative.      Physical Exam     Initial Vitals [09/28/18 1145]   BP Pulse Resp Temp SpO2   (!) 135/105 79 20 98.1 °F (36.7 °C) 99 %      MAP       --         Physical Exam    Nursing note and vitals reviewed.  Constitutional: He appears well-developed and well-nourished. He is not diaphoretic. No distress.   HENT:   Head: Normocephalic and atraumatic.   Mouth/Throat: Oropharynx is clear and moist.   Eyes: Conjunctivae and EOM are normal. Pupils are equal, round, and reactive to light.   Neck: Neck supple. Carotid bruit is not present. No JVD present.   Cardiovascular: Normal rate, regular rhythm, normal heart sounds and intact distal pulses.  No extrasystoles are present.  Exam reveals no gallop and no friction  [FreeTextEntry1] : Vitaliy Ward MD\par \par I saw Nestor Kang on November 13, 2018. As you know, he is an 82y/o man with Hx of possible TIA, HTN, HLD, COPD, recurring hematuria followed by Urology and GIB with noted diverticulosis/polyps s/p removal, and chronic atrial fibrillation maintained on Coumadin who presents today for routine f/u. Admits doing well with no issues or complaints. No recent hematuria or bleeding. Denies chest pain, palpitations, SOB, syncope or near syncope.  rub.    No murmur heard.  Pulses:       Radial pulses are 2+ on the right side, and 2+ on the left side.   Pulmonary/Chest: Breath sounds normal. No respiratory distress. He has no decreased breath sounds. He has no wheezes. He has no rhonchi. He has no rales. He exhibits no tenderness.   Abdominal: Soft. Bowel sounds are normal. He exhibits no distension and no mass. There is no tenderness. There is no rebound and no guarding.   Musculoskeletal: Normal range of motion. He exhibits no edema or tenderness.   Neurological: He is alert and oriented to person, place, and time. He has normal strength. No sensory deficit.   Skin: Skin is warm and dry. Capillary refill takes less than 2 seconds. No rash noted. No erythema. No pallor.   Psychiatric: He has a normal mood and affect. His behavior is normal. Judgment and thought content normal.         ED Course   Procedures  Labs Reviewed   CBC W/ AUTO DIFFERENTIAL   BASIC METABOLIC PANEL          Imaging Results          FL Esophagram Complete (In process)               X-Rays:   Independently Interpreted Readings:   Other Readings:  Dr. Costello performed barium esophagram demonstrating retrocardiac food bolus    Medical Decision Making:   Clinical Tests:   Lab Tests: Ordered and Reviewed  Radiological Study: Ordered and Reviewed  Other:   I have discussed this case with another health care provider.       <> Summary of the Discussion: Two OR with Dr. Medina for EGD and food bolus treatment                      Clinical Impression:   The encounter diagnosis was Esophageal obstruction due to food impaction.                             Pillo Puga MD  09/28/18 1233       Pillo Puga MD  09/28/18 1248

## 2019-05-14 ENCOUNTER — APPOINTMENT (OUTPATIENT)
Dept: ELECTROPHYSIOLOGY | Facility: CLINIC | Age: 84
End: 2019-05-14

## 2019-10-24 ENCOUNTER — APPOINTMENT (OUTPATIENT)
Dept: ENDOCRINOLOGY | Facility: CLINIC | Age: 84
End: 2019-10-24
Payer: MEDICARE

## 2019-10-24 VITALS
WEIGHT: 154 LBS | SYSTOLIC BLOOD PRESSURE: 120 MMHG | DIASTOLIC BLOOD PRESSURE: 80 MMHG | HEART RATE: 98 BPM | BODY MASS INDEX: 20.86 KG/M2 | HEIGHT: 72 IN | OXYGEN SATURATION: 98 %

## 2019-10-24 DIAGNOSIS — E27.8 OTHER SPECIFIED DISORDERS OF ADRENAL GLAND: ICD-10-CM

## 2019-10-24 DIAGNOSIS — E87.1 HYPO-OSMOLALITY AND HYPONATREMIA: ICD-10-CM

## 2019-10-24 PROCEDURE — 99204 OFFICE O/P NEW MOD 45 MIN: CPT

## 2019-10-24 NOTE — END OF VISIT
[FreeTextEntry3] : I, Jean Paul Flores, authored this note working as a medical scribe for Dr. Powell.  10/24/2019.  4:00PM.  This note was authored by the medical scribe for me. I have reviewed, edited, and revised the note as needed. I am in agreement with the exam findings, imaging findings, and treatment plan.  Jose Powell MD

## 2019-10-24 NOTE — REASON FOR VISIT
[Consultation] : a consultation visit [Family Member] : family member [FreeTextEntry1] : Vitaliy Ward MD [Spouse] : spouse

## 2019-10-24 NOTE — CONSULT LETTER
[Dear  ___] : Dear  [unfilled], [Please see my note below.] : Please see my note below. [Consult Letter:] : I had the pleasure of evaluating your patient, [unfilled]. [Consult Closing:] : Thank you very much for allowing me to participate in the care of this patient.  If you have any questions, please do not hesitate to contact me. [Sincerely,] : Sincerely, [FreeTextEntry2] : Vitaliy Ward MD\par [address], \par [place] [id number]\par

## 2019-10-24 NOTE — ASSESSMENT
[FreeTextEntry1] : 84 year old female being referred today for adrenal abnormality and hyponatremia. \par Hyponatremia. Had been due to massive attempt for hydration in the setting of hematuria. He has no previous history of SIADH. I requested a repeat serum chemistries if not recently done. 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 This is difficult to assess at the moment because he is on prednisone for pemphigoid. \par \par Will call   Urology Dr. Mosqueda for prior CT abd scans. It adrenal cyst is old, no further w.u is required. Slip for blood test given. Additional decision will be made after viewing the labs\par \par F/u TBD

## 2019-10-24 NOTE — HISTORY OF PRESENT ILLNESS
[FreeTextEntry1] : Mr. CARLOS is a 84 year old male being referred today for Adrenal abnormality.\par The patient was admitted to the hospital for hematuria. 5/2109 He had consumed approximately 15 bottles of water to help promote urine flow. Sodium on admission 124. No previous history of hyponatremia SIADH etc. The patient is not aware of any followup sodium testing. He has restricted oral fluid intake the standard amounts.\par The patient had a CAT scan which revealed an incidental 4.4 cm cystic adrenal mass. He has no other suggestion of hyperfunction or hypofunction of the atrial gland. He has no symptoms suggestive Cushing's syndrome or pheochromocytoma. He did prednisone June 2019 for pemphigoid. He is not aware of any adrenal testing prior to initiation of prednisone. He is currently on 5 mg daily. He denies any complication such as diabetes due to prednisone.\par \par Pt recently developed Pemphigoid. On prednisone, 5 mg currently. Pt states he feels slowed down in the morning while on prednisone. No complications reported from prednisone. H/o A fib and diverticulitis and hypertension.

## 2020-03-03 ENCOUNTER — APPOINTMENT (OUTPATIENT)
Dept: ELECTROPHYSIOLOGY | Facility: CLINIC | Age: 85
End: 2020-03-03
Payer: MEDICARE

## 2020-03-03 ENCOUNTER — NON-APPOINTMENT (OUTPATIENT)
Age: 85
End: 2020-03-03

## 2020-03-03 VITALS
BODY MASS INDEX: 20.45 KG/M2 | OXYGEN SATURATION: 96 % | HEART RATE: 95 BPM | WEIGHT: 151 LBS | DIASTOLIC BLOOD PRESSURE: 72 MMHG | HEIGHT: 72 IN | SYSTOLIC BLOOD PRESSURE: 111 MMHG

## 2020-03-03 PROCEDURE — 99214 OFFICE O/P EST MOD 30 MIN: CPT

## 2020-03-03 PROCEDURE — 93000 ELECTROCARDIOGRAM COMPLETE: CPT

## 2020-03-03 NOTE — PHYSICAL EXAM
[Normal Appearance] : normal appearance [General Appearance - Well Developed] : well developed [Well Groomed] : well groomed [General Appearance - Well Nourished] : well nourished [No Deformities] : no deformities [General Appearance - In No Acute Distress] : no acute distress [Normal Conjunctiva] : the conjunctiva exhibited no abnormalities [Eyelids - No Xanthelasma] : the eyelids demonstrated no xanthelasmas [No Oral Pallor] : no oral pallor [Normal Oral Mucosa] : normal oral mucosa [No Oral Cyanosis] : no oral cyanosis [Normal Jugular Venous A Waves Present] : normal jugular venous A waves present [Normal Jugular Venous V Waves Present] : normal jugular venous V waves present [No Jugular Venous Kumar A Waves] : no jugular venous kumar A waves [Respiration, Rhythm And Depth] : normal respiratory rhythm and effort [Exaggerated Use Of Accessory Muscles For Inspiration] : no accessory muscle use [Heart Sounds] : normal S1 and S2 [Murmurs] : no murmurs present [Abdomen Soft] : soft [Abdomen Tenderness] : non-tender [Abdomen Mass (___ Cm)] : no abdominal mass palpated [Abnormal Walk] : normal gait [Gait - Sufficient For Exercise Testing] : the gait was sufficient for exercise testing [Nail Clubbing] : no clubbing of the fingernails [Cyanosis, Localized] : no localized cyanosis [Petechial Hemorrhages (___cm)] : no petechial hemorrhages [Skin Color & Pigmentation] : normal skin color and pigmentation [] : no rash [No Venous Stasis] : no venous stasis [Skin Lesions] : no skin lesions [No Skin Ulcers] : no skin ulcer [No Xanthoma] : no  xanthoma was observed [Affect] : the affect was normal [Oriented To Time, Place, And Person] : oriented to person, place, and time [No Anxiety] : not feeling anxious [Mood] : the mood was normal [FreeTextEntry1] : irregular irregular

## 2020-03-03 NOTE — DISCUSSION/SUMMARY
[FreeTextEntry1] : In summary Nestor Kang is an 83y/o man with Hx of possible TIA, HTN, HLD, COPD, recurring hematuria followed by Urology and GIB with noted diverticulosis/polyps s/p removal, and chronic atrial fibrillation maintained on Coumadin who presents today for routine f/u. He continues to experience occasional hematuria usually managed by hydration. But one day he drank 15 bottles of water, and became woozy and developed mental status changes. He went to [facility] had issue with salt level (?NA low).  Also lowered warfarin because of hematuria. Then another bleed in January and February with clots. Urologist noted an enlarged prostate, INR 1.9 and discussed possible prostate embolization. Discussed risks, benefits and alternatives of Watchman with patient and he is leaning more towards embolization of prostate.\par \par Sincerely,\par \par Robert Carbajal MD

## 2020-03-03 NOTE — HISTORY OF PRESENT ILLNESS
[FreeTextEntry1] : Vitaliy Ward MD\par \par I saw Nestor Kang on March 3, 2020. As you know, he is an 83y/o man with Hx of possible TIA, HTN, HLD, COPD, recurring hematuria followed by Urology and GIB with noted diverticulosis/polyps s/p removal, and chronic atrial fibrillation maintained on Coumadin who presents today for routine f/u. He continues to experience occasional hematuria usually managed by hydration. But one day he drank 15 bottles of water, and became woozy and developed mental status changes. He went to [facility] had issue with salt level (?NA low).  Also lowered warfarin because of hematuria. Then another bleed in January and February with clots. Urologist noted an enlarged prostate, INR 1.9 and discussed possible prostate embolization.

## 2021-02-02 ENCOUNTER — TRANSCRIPTION ENCOUNTER (OUTPATIENT)
Age: 86
End: 2021-02-02

## 2021-07-23 ENCOUNTER — APPOINTMENT (OUTPATIENT)
Dept: ELECTROPHYSIOLOGY | Facility: CLINIC | Age: 86
End: 2021-07-23
Payer: MEDICARE

## 2021-07-23 ENCOUNTER — NON-APPOINTMENT (OUTPATIENT)
Age: 86
End: 2021-07-23

## 2021-07-23 VITALS
SYSTOLIC BLOOD PRESSURE: 159 MMHG | HEART RATE: 97 BPM | WEIGHT: 150 LBS | OXYGEN SATURATION: 96 % | TEMPERATURE: 93.8 F | BODY MASS INDEX: 20.32 KG/M2 | DIASTOLIC BLOOD PRESSURE: 102 MMHG | RESPIRATION RATE: 16 BRPM | HEIGHT: 72 IN

## 2021-07-23 PROCEDURE — 99213 OFFICE O/P EST LOW 20 MIN: CPT

## 2021-07-23 PROCEDURE — 93000 ELECTROCARDIOGRAM COMPLETE: CPT

## 2021-07-24 NOTE — HISTORY OF PRESENT ILLNESS
[FreeTextEntry1] : Vitaliy Ward MD\par \par I saw Nestor Kang on July 23, 2021.  As you know, he is an 87y/o man with Hx of possible TIA, HTN, HLD, COPD, recurring hematuria followed by Urology and GIB with noted diverticulosis/polyps s/p removal, and chronic atrial fibrillation maintained on Coumadin who presents today for routine f/u. He used to experience occasional hematuria usually managed by hydration. But one day he drank 15 bottles of water, and became woozy and developed mental status changes. He went to [facility] had issue with salt level (?NA low).  Also lowered warfarin because of hematuria. Then another bleed in January and February with clots. Urologist noted an enlarged prostate, INR 1.9 and discussed possible prostate embolization. Since stopping EtOH and antibiotic, he stopped bleeding. He has not had chest pain, dyspnea, palpitations, lightheadedness, syncope, near syncope or unusual fatigue. SPB in Dr. Ward's office was 130. 130/70 by me.  When patient first arrived his blood pressure was 159/102. \par \par

## 2021-07-24 NOTE — PHYSICAL EXAM
[General Appearance - Well Developed] : well developed [Normal Appearance] : normal appearance [Well Groomed] : well groomed [General Appearance - Well Nourished] : well nourished [No Deformities] : no deformities [General Appearance - In No Acute Distress] : no acute distress [Normal Conjunctiva] : the conjunctiva exhibited no abnormalities [Eyelids - No Xanthelasma] : the eyelids demonstrated no xanthelasmas [Normal Oral Mucosa] : normal oral mucosa [No Oral Pallor] : no oral pallor [No Oral Cyanosis] : no oral cyanosis [Normal Jugular Venous A Waves Present] : normal jugular venous A waves present [Normal Jugular Venous V Waves Present] : normal jugular venous V waves present [No Jugular Venous Kumar A Waves] : no jugular venous kumar A waves [Respiration, Rhythm And Depth] : normal respiratory rhythm and effort [Exaggerated Use Of Accessory Muscles For Inspiration] : no accessory muscle use [Heart Sounds] : normal S1 and S2 [Murmurs] : no murmurs present [Abdomen Soft] : soft [Abdomen Tenderness] : non-tender [Abdomen Mass (___ Cm)] : no abdominal mass palpated [Abnormal Walk] : normal gait [Gait - Sufficient For Exercise Testing] : the gait was sufficient for exercise testing [Nail Clubbing] : no clubbing of the fingernails [Cyanosis, Localized] : no localized cyanosis [Petechial Hemorrhages (___cm)] : no petechial hemorrhages [Skin Color & Pigmentation] : normal skin color and pigmentation [] : no rash [No Venous Stasis] : no venous stasis [Skin Lesions] : no skin lesions [No Skin Ulcers] : no skin ulcer [No Xanthoma] : no  xanthoma was observed [Oriented To Time, Place, And Person] : oriented to person, place, and time [Affect] : the affect was normal [Mood] : the mood was normal [No Anxiety] : not feeling anxious [FreeTextEntry1] : irregular irregular

## 2021-07-24 NOTE — DISCUSSION/SUMMARY
[FreeTextEntry1] : In summary Nestor Kang is an 85y/o man with Hx of possible TIA, HTN, HLD, COPD, recurring hematuria followed by Urology and GIB with noted diverticulosis/polyps s/p removal, and chronic atrial fibrillation maintained on Coumadin who presents today for routine f/u. \par \par 1. Persistent atrial fibrillation: continue warfarin.  Did discuss NOAC but patient still prefers warfarin. \par \par 2. Hematuria: bleeding stopped after discontinuation of ETOH and antibiotics\par \par 3. Elevated BP: with time blood pressure decreased in the office. \par \par 4. f/u in one year. \par \par Sincerely,\par \par Robert Carbajal MD

## 2022-04-14 ENCOUNTER — NON-APPOINTMENT (OUTPATIENT)
Age: 87
End: 2022-04-14

## 2022-07-11 ENCOUNTER — RX RENEWAL (OUTPATIENT)
Age: 87
End: 2022-07-11

## 2022-07-12 ENCOUNTER — APPOINTMENT (OUTPATIENT)
Dept: ELECTROPHYSIOLOGY | Facility: CLINIC | Age: 87
End: 2022-07-12

## 2022-07-12 ENCOUNTER — NON-APPOINTMENT (OUTPATIENT)
Age: 87
End: 2022-07-12

## 2022-07-12 VITALS
DIASTOLIC BLOOD PRESSURE: 87 MMHG | BODY MASS INDEX: 19.64 KG/M2 | HEIGHT: 72 IN | WEIGHT: 145 LBS | HEART RATE: 95 BPM | SYSTOLIC BLOOD PRESSURE: 144 MMHG | OXYGEN SATURATION: 96 %

## 2022-07-12 DIAGNOSIS — R06.2 WHEEZING: ICD-10-CM

## 2022-07-12 DIAGNOSIS — R06.00 DYSPNEA, UNSPECIFIED: ICD-10-CM

## 2022-07-12 DIAGNOSIS — R60.0 LOCALIZED EDEMA: ICD-10-CM

## 2022-07-12 PROCEDURE — 93000 ELECTROCARDIOGRAM COMPLETE: CPT

## 2022-07-12 PROCEDURE — 99215 OFFICE O/P EST HI 40 MIN: CPT

## 2022-07-12 NOTE — DISCUSSION/SUMMARY
[FreeTextEntry1] : Impression:\par \par 1. Permanent afib: EKG performed today to assess for presence of conduction disease and reveals afib, well rate controlled. On metoprolol 50 BID and Coumadin. Resume routine INR monitoring as scheduled. Noted B/L LE/ankle edema, +1. Unknown last ECHO. Recommend ECHO to ensure normal LVEF. Consider low dose diuretics given edema and OWEN. \par \par 2. HTN: resume oral antihypertensives as prescribed. Encouraged heart healthy diet, sodium restriction, and weight loss. Continue regular f/u with Cardiologist for further HTN management.\par \par 3. HLD: resume statin therapy as prescribed and regular f/u with Cardiologist for routine lipid monitoring and management.\par \par 4. Expiratory wheezing: b/l expiratory wheeze noted throughout all lung fields, has Hx of COPD. No Pulmonary work up in recent time. Recent chest Xray as per pt. Consider Pulm eval/need for inhalers given dyspnea and expiratory wheezing. \par \par Continue f/u with Cardiologist and RTO for f/u in 1 year.  [EKG obtained to assist in diagnosis and management of assessed problem(s)] : EKG obtained to assist in diagnosis and management of assessed problem(s)

## 2022-07-12 NOTE — HISTORY OF PRESENT ILLNESS
[FreeTextEntry1] : Nestro Kang is an 86 y/o man with Hx of possible TIA, HTN, HLD, COPD, recurring hematuria followed by Urology and GIB with noted diverticulosis/polyps s/p removal, and chronic atrial fibrillation maintained on Coumadin who presents today for routine f/u. Has been doing well but does have dyspnea on exertion. Stable on Coumadin, monitoring INRs and no evidence of bleeding. Denies chest pain, palpitations, SOB at rest, syncope or near syncope.\par

## 2022-07-12 NOTE — PHYSICAL EXAM
[Well Developed] : well developed [Well Nourished] : well nourished [No Acute Distress] : no acute distress [Normal Conjunctiva] : normal conjunctiva [Normal Venous Pressure] : normal venous pressure [No Carotid Bruit] : no carotid bruit [Normal S1, S2] : normal S1, S2 [No Murmur] : no murmur [No Rub] : no rub [No Gallop] : no gallop [Irregularly Irregular] : irregularly irregular [Good Air Entry] : good air entry [No Respiratory Distress] : no respiratory distress  [Soft] : abdomen soft [Non Tender] : non-tender [No Masses/organomegaly] : no masses/organomegaly [Normal Bowel Sounds] : normal bowel sounds [Normal Gait] : normal gait [No Edema] : no edema [No Cyanosis] : no cyanosis [No Clubbing] : no clubbing [No Varicosities] : no varicosities [No Rash] : no rash [No Skin Lesions] : no skin lesions [Moves all extremities] : moves all extremities [No Focal Deficits] : no focal deficits [Normal Speech] : normal speech [Alert and Oriented] : alert and oriented [Normal memory] : normal memory [de-identified] : +1 B/L LE edema  [de-identified] : B/L expiratory wheezing

## 2022-07-19 ENCOUNTER — APPOINTMENT (OUTPATIENT)
Dept: CV DIAGNOSITCS | Facility: HOSPITAL | Age: 87
End: 2022-07-19

## 2022-07-19 ENCOUNTER — OUTPATIENT (OUTPATIENT)
Dept: OUTPATIENT SERVICES | Facility: HOSPITAL | Age: 87
LOS: 1 days | End: 2022-07-19

## 2022-07-19 DIAGNOSIS — I48.91 UNSPECIFIED ATRIAL FIBRILLATION: ICD-10-CM

## 2022-07-19 PROCEDURE — 93306 TTE W/DOPPLER COMPLETE: CPT | Mod: 26

## 2022-07-20 ENCOUNTER — NON-APPOINTMENT (OUTPATIENT)
Age: 87
End: 2022-07-20

## 2022-07-27 ENCOUNTER — NON-APPOINTMENT (OUTPATIENT)
Age: 87
End: 2022-07-27

## 2022-07-28 ENCOUNTER — NON-APPOINTMENT (OUTPATIENT)
Age: 87
End: 2022-07-28

## 2022-08-04 ENCOUNTER — RESULT REVIEW (OUTPATIENT)
Age: 87
End: 2022-08-04

## 2022-08-18 ENCOUNTER — NON-APPOINTMENT (OUTPATIENT)
Age: 87
End: 2022-08-18

## 2023-01-09 ENCOUNTER — APPOINTMENT (OUTPATIENT)
Dept: ORTHOPEDIC SURGERY | Facility: CLINIC | Age: 88
End: 2023-01-09
Payer: MEDICARE

## 2023-01-09 VITALS — BODY MASS INDEX: 19.64 KG/M2 | WEIGHT: 145 LBS | HEIGHT: 72 IN

## 2023-01-09 PROCEDURE — 99203 OFFICE O/P NEW LOW 30 MIN: CPT

## 2023-01-09 PROCEDURE — 73080 X-RAY EXAM OF ELBOW: CPT | Mod: LT

## 2023-01-09 PROCEDURE — 73110 X-RAY EXAM OF WRIST: CPT | Mod: LT

## 2023-01-10 NOTE — HISTORY OF PRESENT ILLNESS
[8] : 8 [7] : 7 [Dull/Aching] : dull/aching [Ice] : ice [de-identified] : He fell on L side on Thurs\par He has pain and swelling- improved since then\par On Coumadin [] : no [FreeTextEntry1] : L elbow [FreeTextEntry3] : 1/5/23 [FreeTextEntry5] : he tripped and fell outside. went to [facility] and had XR [de-identified] : activity [de-identified] : 1/5/23 [de-identified] : [place]  [de-identified] : xr

## 2023-01-23 ENCOUNTER — APPOINTMENT (OUTPATIENT)
Dept: ORTHOPEDIC SURGERY | Facility: CLINIC | Age: 88
End: 2023-01-23
Payer: MEDICARE

## 2023-01-23 VITALS — HEIGHT: 72 IN | BODY MASS INDEX: 19.64 KG/M2 | WEIGHT: 145 LBS

## 2023-01-23 DIAGNOSIS — S60.212A CONTUSION OF LEFT WRIST, INITIAL ENCOUNTER: ICD-10-CM

## 2023-01-23 DIAGNOSIS — S50.02XA CONTUSION OF LEFT ELBOW, INITIAL ENCOUNTER: ICD-10-CM

## 2023-01-23 PROCEDURE — 99213 OFFICE O/P EST LOW 20 MIN: CPT

## 2023-01-23 NOTE — HISTORY OF PRESENT ILLNESS
[5] : 5 [4] : 4 [Dull/Aching] : dull/aching [de-identified] : L wrist and elbow much better [de-identified] : ice

## 2023-04-05 ENCOUNTER — NON-APPOINTMENT (OUTPATIENT)
Age: 88
End: 2023-04-05

## 2023-06-27 ENCOUNTER — NON-APPOINTMENT (OUTPATIENT)
Age: 88
End: 2023-06-27

## 2023-07-11 ENCOUNTER — APPOINTMENT (OUTPATIENT)
Dept: ELECTROPHYSIOLOGY | Facility: CLINIC | Age: 88
End: 2023-07-11
Payer: MEDICARE

## 2023-07-11 ENCOUNTER — NON-APPOINTMENT (OUTPATIENT)
Age: 88
End: 2023-07-11

## 2023-07-11 VITALS
BODY MASS INDEX: 19.64 KG/M2 | OXYGEN SATURATION: 98 % | SYSTOLIC BLOOD PRESSURE: 137 MMHG | DIASTOLIC BLOOD PRESSURE: 86 MMHG | WEIGHT: 145 LBS | HEIGHT: 72 IN | HEART RATE: 59 BPM

## 2023-07-11 DIAGNOSIS — I48.20 CHRONIC ATRIAL FIBRILLATION, UNSP: ICD-10-CM

## 2023-07-11 DIAGNOSIS — Z79.01 LONG TERM (CURRENT) USE OF ANTICOAGULANTS: ICD-10-CM

## 2023-07-11 PROCEDURE — 93000 ELECTROCARDIOGRAM COMPLETE: CPT

## 2023-07-11 PROCEDURE — 99213 OFFICE O/P EST LOW 20 MIN: CPT

## 2023-07-11 NOTE — DISCUSSION/SUMMARY
[EKG obtained to assist in diagnosis and management of assessed problem(s)] : EKG obtained to assist in diagnosis and management of assessed problem(s) [FreeTextEntry1] : Impression:\par \par 1. Permanent afib: EKG performed today to assess for presence of conduction disease and reveals afib, well rate controlled. On metoprolol 50 BID and Coumadin. Resume routine INR monitoring as scheduled. Discussed possibility of switching to Eliquis. Discussed pros and cons of both. For now, will resume warfarin (concern about price of Eliquis). Noted B/L LE/ankle edema, +1. ECHO with normal LV function. \par \par 2. HTN: resume oral antihypertensives as prescribed. Encouraged heart healthy diet, sodium restriction, and weight loss. Continue regular f/u with Cardiologist for further HTN management.\par \par 3. HLD: resume statin therapy as prescribed and regular f/u with Cardiologist for routine lipid monitoring and management.\par \par Continue f/u with Cardiologist and RTO for f/u in 6 months.

## 2023-07-11 NOTE — HISTORY OF PRESENT ILLNESS
[FreeTextEntry1] : Nestor Kang is an 89 y/o man with Hx of possible TIA, HTN, HLD, COPD, recurring hematuria followed by Urology and GIB with noted diverticulosis/polyps s/p removal, and chronic atrial fibrillation maintained on Coumadin who presents today for routine f/u. Has been doing well but does have dyspnea on exertion. Stable on Coumadin, monitoring INRs and no evidence of bleeding. Denies chest pain, palpitations, SOB at rest, syncope or near syncope. Did have lung ca and underwent 5 radiation treatments and now most recent CT clear.

## 2023-07-11 NOTE — PHYSICAL EXAM
[Well Developed] : well developed [Well Nourished] : well nourished [No Acute Distress] : no acute distress [Normal Conjunctiva] : normal conjunctiva [Normal Venous Pressure] : normal venous pressure [No Carotid Bruit] : no carotid bruit [Normal S1, S2] : normal S1, S2 [No Murmur] : no murmur [No Rub] : no rub [No Gallop] : no gallop [Irregularly Irregular] : irregularly irregular [Good Air Entry] : good air entry [No Respiratory Distress] : no respiratory distress  [Soft] : abdomen soft [Non Tender] : non-tender [No Masses/organomegaly] : no masses/organomegaly [Normal Bowel Sounds] : normal bowel sounds [Normal Gait] : normal gait [No Edema] : no edema [No Cyanosis] : no cyanosis [No Clubbing] : no clubbing [No Varicosities] : no varicosities [No Rash] : no rash [No Skin Lesions] : no skin lesions [Moves all extremities] : moves all extremities [No Focal Deficits] : no focal deficits [Normal Speech] : normal speech [Alert and Oriented] : alert and oriented [Normal memory] : normal memory [de-identified] : +1 B/L LE edema

## 2024-01-09 ENCOUNTER — APPOINTMENT (OUTPATIENT)
Dept: ELECTROPHYSIOLOGY | Facility: CLINIC | Age: 89
End: 2024-01-09
Payer: MEDICARE

## 2024-01-09 ENCOUNTER — NON-APPOINTMENT (OUTPATIENT)
Age: 89
End: 2024-01-09

## 2024-01-09 VITALS — HEART RATE: 100 BPM | SYSTOLIC BLOOD PRESSURE: 161 MMHG | DIASTOLIC BLOOD PRESSURE: 102 MMHG | OXYGEN SATURATION: 94 %

## 2024-01-09 DIAGNOSIS — E78.00 PURE HYPERCHOLESTEROLEMIA, UNSPECIFIED: ICD-10-CM

## 2024-01-09 DIAGNOSIS — I10 ESSENTIAL (PRIMARY) HYPERTENSION: ICD-10-CM

## 2024-01-09 DIAGNOSIS — I48.21 PERMANENT ATRIAL FIBRILLATION: ICD-10-CM

## 2024-01-09 DIAGNOSIS — I48.19 OTHER PERSISTENT ATRIAL FIBRILLATION: ICD-10-CM

## 2024-01-09 PROCEDURE — 93000 ELECTROCARDIOGRAM COMPLETE: CPT

## 2024-01-09 PROCEDURE — 99213 OFFICE O/P EST LOW 20 MIN: CPT

## 2024-01-09 RX ORDER — FLUTICASONE FUROATE AND VILANTEROL TRIFENATATE 100; 25 UG/1; UG/1
100-25 POWDER RESPIRATORY (INHALATION)
Refills: 0 | Status: ACTIVE | COMMUNITY
Start: 2023-07-11

## 2024-01-09 RX ORDER — MULTIVIT-MIN/IRON/FOLIC ACID/K 18-600-40
500 CAPSULE ORAL DAILY
Refills: 0 | Status: ACTIVE | COMMUNITY
Start: 2018-05-15

## 2024-01-09 RX ORDER — PREDNISONE 5 MG/1
5 TABLET ORAL
Refills: 0 | Status: DISCONTINUED | COMMUNITY
End: 2024-01-09

## 2024-01-09 RX ORDER — ALFUZOSIN HCL 10 MG/1
10 TABLET, EXTENDED RELEASE ORAL DAILY
Refills: 0 | Status: ACTIVE | COMMUNITY
Start: 2018-05-15

## 2024-01-09 RX ORDER — METHENAMINE HIPPURATE 1 G/1
1 TABLET ORAL DAILY
Refills: 0 | Status: ACTIVE | COMMUNITY
Start: 2018-05-15

## 2024-01-31 ENCOUNTER — EMERGENCY (EMERGENCY)
Facility: HOSPITAL | Age: 89
LOS: 1 days | Discharge: HOME | End: 2024-01-31
Attending: EMERGENCY MEDICINE | Admitting: EMERGENCY MEDICINE
Payer: MEDICARE

## 2024-01-31 VITALS
SYSTOLIC BLOOD PRESSURE: 145 MMHG | TEMPERATURE: 97 F | RESPIRATION RATE: 16 BRPM | OXYGEN SATURATION: 97 % | HEART RATE: 88 BPM | DIASTOLIC BLOOD PRESSURE: 79 MMHG

## 2024-01-31 LAB
ALBUMIN SERPL ELPH-MCNC: 3.8 G/DL — SIGNIFICANT CHANGE UP (ref 3.3–5)
ALP SERPL-CCNC: 96 U/L — SIGNIFICANT CHANGE UP (ref 40–120)
ALT FLD-CCNC: 14 U/L — SIGNIFICANT CHANGE UP (ref 4–41)
ANION GAP SERPL CALC-SCNC: 11 MMOL/L — SIGNIFICANT CHANGE UP (ref 7–14)
APTT BLD: 45.8 SEC — HIGH (ref 24.5–35.6)
AST SERPL-CCNC: 21 U/L — SIGNIFICANT CHANGE UP (ref 4–40)
BASOPHILS # BLD AUTO: 0.04 K/UL — SIGNIFICANT CHANGE UP (ref 0–0.2)
BASOPHILS NFR BLD AUTO: 0.4 % — SIGNIFICANT CHANGE UP (ref 0–2)
BILIRUB SERPL-MCNC: 1.4 MG/DL — HIGH (ref 0.2–1.2)
BUN SERPL-MCNC: 22 MG/DL — SIGNIFICANT CHANGE UP (ref 7–23)
CALCIUM SERPL-MCNC: 8.7 MG/DL — SIGNIFICANT CHANGE UP (ref 8.4–10.5)
CHLORIDE SERPL-SCNC: 103 MMOL/L — SIGNIFICANT CHANGE UP (ref 98–107)
CO2 SERPL-SCNC: 23 MMOL/L — SIGNIFICANT CHANGE UP (ref 22–31)
CREAT SERPL-MCNC: 0.75 MG/DL — SIGNIFICANT CHANGE UP (ref 0.5–1.3)
CRP SERPL-MCNC: 33.5 MG/L — HIGH
EGFR: 87 ML/MIN/1.73M2 — SIGNIFICANT CHANGE UP
EOSINOPHIL # BLD AUTO: 0.26 K/UL — SIGNIFICANT CHANGE UP (ref 0–0.5)
EOSINOPHIL NFR BLD AUTO: 2.5 % — SIGNIFICANT CHANGE UP (ref 0–6)
ERYTHROCYTE [SEDIMENTATION RATE] IN BLOOD: 12 MM/HR — SIGNIFICANT CHANGE UP (ref 1–15)
GLUCOSE SERPL-MCNC: 92 MG/DL — SIGNIFICANT CHANGE UP (ref 70–99)
HCT VFR BLD CALC: 43.6 % — SIGNIFICANT CHANGE UP (ref 39–50)
HGB BLD-MCNC: 14.2 G/DL — SIGNIFICANT CHANGE UP (ref 13–17)
IANC: 7.17 K/UL — SIGNIFICANT CHANGE UP (ref 1.8–7.4)
IMM GRANULOCYTES NFR BLD AUTO: 0.4 % — SIGNIFICANT CHANGE UP (ref 0–0.9)
INR BLD: 2.95 RATIO — HIGH (ref 0.85–1.18)
LYMPHOCYTES # BLD AUTO: 1.86 K/UL — SIGNIFICANT CHANGE UP (ref 1–3.3)
LYMPHOCYTES # BLD AUTO: 17.8 % — SIGNIFICANT CHANGE UP (ref 13–44)
MCHC RBC-ENTMCNC: 30.7 PG — SIGNIFICANT CHANGE UP (ref 27–34)
MCHC RBC-ENTMCNC: 32.6 GM/DL — SIGNIFICANT CHANGE UP (ref 32–36)
MCV RBC AUTO: 94.4 FL — SIGNIFICANT CHANGE UP (ref 80–100)
MONOCYTES # BLD AUTO: 1.07 K/UL — HIGH (ref 0–0.9)
MONOCYTES NFR BLD AUTO: 10.2 % — SIGNIFICANT CHANGE UP (ref 2–14)
NEUTROPHILS # BLD AUTO: 7.17 K/UL — SIGNIFICANT CHANGE UP (ref 1.8–7.4)
NEUTROPHILS NFR BLD AUTO: 68.7 % — SIGNIFICANT CHANGE UP (ref 43–77)
NRBC # BLD: 0 /100 WBCS — SIGNIFICANT CHANGE UP (ref 0–0)
NRBC # FLD: 0 K/UL — SIGNIFICANT CHANGE UP (ref 0–0)
PLATELET # BLD AUTO: 186 K/UL — SIGNIFICANT CHANGE UP (ref 150–400)
POTASSIUM SERPL-MCNC: 4.4 MMOL/L — SIGNIFICANT CHANGE UP (ref 3.5–5.3)
POTASSIUM SERPL-SCNC: 4.4 MMOL/L — SIGNIFICANT CHANGE UP (ref 3.5–5.3)
PROT SERPL-MCNC: 6.9 G/DL — SIGNIFICANT CHANGE UP (ref 6–8.3)
PROTHROM AB SERPL-ACNC: 32 SEC — HIGH (ref 9.5–13)
RBC # BLD: 4.62 M/UL — SIGNIFICANT CHANGE UP (ref 4.2–5.8)
RBC # FLD: 13.6 % — SIGNIFICANT CHANGE UP (ref 10.3–14.5)
SODIUM SERPL-SCNC: 137 MMOL/L — SIGNIFICANT CHANGE UP (ref 135–145)
WBC # BLD: 10.44 K/UL — SIGNIFICANT CHANGE UP (ref 3.8–10.5)
WBC # FLD AUTO: 10.44 K/UL — SIGNIFICANT CHANGE UP (ref 3.8–10.5)

## 2024-01-31 PROCEDURE — 99284 EMERGENCY DEPT VISIT MOD MDM: CPT

## 2024-01-31 PROCEDURE — 73120 X-RAY EXAM OF HAND: CPT | Mod: 26,LT

## 2024-01-31 PROCEDURE — 73110 X-RAY EXAM OF WRIST: CPT | Mod: 26,LT

## 2024-01-31 NOTE — ED PROVIDER NOTE - PATIENT PORTAL LINK FT
You can access the FollowMyHealth Patient Portal offered by [facility] by registering at the following website: [email or web address]. By joining [employer]’s FollowMyHealth portal, you will also be able to view your health information using other applications (apps) compatible with our system.

## 2024-01-31 NOTE — ED PROVIDER NOTE - CLINICAL SUMMARY MEDICAL DECISION MAKING FREE TEXT BOX
MITCHELL  88M on coumadin presents to the ED with worsened hand swelling 48 hours after blunt trauma. Will obtain XR to evaluate for occult fracture. Blood work to screen for signs of infection although Knavel signs are negative. Suspect patient with pronounced post-traumatic swelling/hematoma due to AC use MITCHELL  88M on coumadin presents to the ED with worsened hand swelling 48 hours after blunt trauma. Will obtain XR to evaluate for occult fracture. Blood work to screen for signs of infection although Kanavel signs are negative. Suspect patient with pronounced post-traumatic swelling/hematoma due to AC use.

## 2024-01-31 NOTE — ED ADULT NURSE NOTE - NSFALLHARMRISKINTERV_ED_ALL_ED

## 2024-01-31 NOTE — ED PROVIDER NOTE - OBJECTIVE STATEMENT
88M with h/o HTN, A fib on coumadin, HLD presents to the ED with hand swelling. Patient notes that 2 days ago he had a mechanical trip and fall while crossing the street. Fell on his left hand onto gravel. Did not hit his head. Went to [facility] for an eval and had negative XR. Had abrasions on the hand and returned today for wound check and was sent to the ED for worsening swelling. Patient notes 1d ago swelling worsened - has not worsened today. No pain, discharge, F/C, numbness, tingling 88M with h/o HTN, A fib on coumadin, HLD presents to the ED with hand swelling. Patient notes that 2 days ago he had a mechanical trip and fall while crossing the street. Fell on his left hand onto gravel. Did not hit his head. Went to [facility] for an eval and had negative XR. Had abrasions on the hand and returned today for wound check and was sent to the ED for worsening swelling. Patient notes 1d ago swelling worsened - has not worsened today. No pain, discharge, F/C, numbness, tingling.  Attending - Agree with above.  I evaluated patient myself. 89y/o M with wife at bedside.  Noted PMH including afib on coumadin.  Trip and fall onto hands on Monday causing left palmar abrasions.  To  where patient reports xrays without fracture and abrasions cleaned and prescribed keflex for prophylaxis, but patient did not take.  Since having increased left hand swelling, so returned to  and referred to ED for further evaluation.  No increased pain or difficulty moving hand.  No fever.  No other complaint.

## 2024-01-31 NOTE — ED PROVIDER NOTE - NSICDXPASTMEDICALHX_GEN_ALL_CORE_FT
PAST MEDICAL HISTORY:  AF (Atrial Fibrillation) x 4-5 years    Enlarged prostate     HTN (hypertension)     Hypercholesterolemia

## 2024-01-31 NOTE — ED PROVIDER NOTE - PHYSICAL EXAMINATION
CONSTITUTIONAL: Well appearing, awake, alert  ENMT: Airway patent, tolerating secretions.    NECK: Supple. No JVD  EYES: Clear bilaterally, pupils equal, round  CARDIAC: Warm, well-perfused  RESPIRATORY: Equal Chest Rise, no stridor. No respiratory distress.   GASTROINTESTINAL: Non-distended  MUSCULOSKELETAL: No gross joint deformity. LUE: L Hand with hyperemia and diffuse swelling along the entire hand including digits. No tenderness along flexor sheaths. fingers not held in flexion and does not have pain with passive extension.   sensation intact along median, radial, ulnar distributions.   Able to make OK sign, spread fingers apart, extend wrist   no bony tenderness along wrist or fingers  NEUROLOGICAL: Alert. Speech Fluent. Attends to conversation and exam  SKIN:  superficial abrasions along the L palmar area with no surrounding erythema, induration, or discharge   PSYCHIATRIC: Normal affect. Cooperative with history and exam CONSTITUTIONAL: Well appearing, awake, alert  ENMT: Airway patent, tolerating secretions.    NECK: Supple. No JVD  EYES: Clear bilaterally, pupils equal, round  CARDIAC: Warm, well-perfused  RESPIRATORY: Equal Chest Rise, no stridor. No respiratory distress.   GASTROINTESTINAL: Non-distended  MUSCULOSKELETAL: No gross joint deformity. LUE: L Hand with hyperemia and diffuse swelling along the entire hand including digits. No tenderness along flexor sheaths. fingers not held in flexion and does not have pain with passive extension.   sensation intact along median, radial, ulnar distributions.   Able to make OK sign, spread fingers apart, extend wrist   no bony tenderness along wrist or fingers  NEUROLOGICAL: Alert. Speech Fluent. Attends to conversation and exam  SKIN:  superficial abrasions along the L palmar area with no surrounding erythema, induration, or discharge   PSYCHIATRIC: Normal affect. Cooperative with history and exam  ATTENDING PHYSICAL EXAM  GEN - NAD; well appearing; A+O x3  HEAD - NC/AT; EYES/NOSE - PERRL, EOMI, mucous membranes moist, no discharge; THROAT: Oral cavity and pharynx normal. No inflammation, swelling, exudate, or lesions  NECK: Neck supple, non-tender without lymphadenopathy, no masses, no JVD  PULMONARY - CTA b/l, symmetric breath sounds, no w/r/r  CARDIAC -s1s2, RRR, no M,R,G  EXTREMITIES - Left arm, elbow, forearm normal.  Left wrist with FROM, nontender.  No snuff box tenderness.  + swelling noted diffusely over dorsal aspect.  No bony tenderness.  Noted palmar abrasions with discharge/pus.  All fingers with FROM.  No erythema or warmth.  No evidence of infection. 2+ rad pulse.

## 2024-01-31 NOTE — ED ADULT NURSE NOTE - OBJECTIVE STATEMENT
received patient to 1a for fall. A&o4, ambulatory, pt had a trip and fall 2 days ago states landed on left hand.  Noticed increased swelling and redness today, no hitting head or LOC at the time of fall. Ambulatory in ER, +pulses and ROM of fingers, right 20g ac placed, labs sent.

## 2024-01-31 NOTE — ED PROVIDER NOTE - ATTENDING CONTRIBUTION TO CARE
I performed a face to face evaluation of this patient and performed a full history and physical examination on the patient.  I agree with the fellow's history, physical examination, and plan of the patient.

## 2024-01-31 NOTE — ED ADULT TRIAGE NOTE - CHIEF COMPLAINT QUOTE
Patient sent from urgent care with L hand wound s/p fall 2 days ago. Hand is swollen with two draining areas on palm, has been using mupirocin. Sensation intact, able to wiggle, +radial pulse, bruising and swelling noted up forearm. Denies headstrike/LOC/other injuries. Denies fever. Phx HTN, HLD, afib on coumadin

## 2024-01-31 NOTE — ED PROVIDER NOTE - NSFOLLOWUPINSTRUCTIONS_ED_ALL_ED_FT
You were seen in the ER for hand swelling    Your XR did not show signs of a broken bone or infection    Keep the hand in ace wrap as able. Keep the extremity elevated to reduce swelling. Although we are sending you home, no ER evaluation is complete without outpatient follow-up. Follow up with your regular doctor within the next 1 week. Return to the ER for worsening symptoms, worsening pain/swelling, numbness/tingling

## 2024-07-19 ENCOUNTER — NON-APPOINTMENT (OUTPATIENT)
Age: 89
End: 2024-07-19

## 2024-07-19 ENCOUNTER — APPOINTMENT (OUTPATIENT)
Dept: ELECTROPHYSIOLOGY | Facility: CLINIC | Age: 89
End: 2024-07-19
Payer: MEDICARE

## 2024-07-19 VITALS — DIASTOLIC BLOOD PRESSURE: 75 MMHG | SYSTOLIC BLOOD PRESSURE: 168 MMHG

## 2024-07-19 VITALS
OXYGEN SATURATION: 93 % | BODY MASS INDEX: 19.56 KG/M2 | WEIGHT: 144.38 LBS | SYSTOLIC BLOOD PRESSURE: 162 MMHG | HEIGHT: 72 IN | HEART RATE: 83 BPM | DIASTOLIC BLOOD PRESSURE: 94 MMHG

## 2024-07-19 PROCEDURE — 99213 OFFICE O/P EST LOW 20 MIN: CPT

## 2024-07-19 PROCEDURE — G2211 COMPLEX E/M VISIT ADD ON: CPT

## 2024-07-19 PROCEDURE — 93000 ELECTROCARDIOGRAM COMPLETE: CPT

## 2024-08-23 NOTE — PHYSICAL THERAPY INITIAL EVALUATION ADULT - RANGE OF MOTION EXAMINATION, REHAB EVAL
Orders Placed This Encounter   Procedures    PET CT SKULL BASE TO MID THIGH    MRI BRAIN W WO CONTRAST    Ambulatory Referral to Care Management     Please make an urgent appointment Jonny Mckinley, ( pulmonologist), pt is an established patient.  Return in about 3 weeks (around 9/13/2024).MD visit+ review PET + pulmonology rec's.       bilateral lower extremity ROM was WFL (within functional limits)/bilateral upper extremity ROM was WFL (within functional limits)

## 2024-08-25 ENCOUNTER — INPATIENT (INPATIENT)
Facility: HOSPITAL | Age: 89
LOS: 1 days | Discharge: HOME | End: 2024-08-27
Attending: HOSPITALIST | Admitting: HOSPITALIST
Payer: MEDICARE

## 2024-08-25 VITALS
RESPIRATION RATE: 21 BRPM | OXYGEN SATURATION: 98 % | DIASTOLIC BLOOD PRESSURE: 88 MMHG | SYSTOLIC BLOOD PRESSURE: 138 MMHG | WEIGHT: 143.96 LBS | HEART RATE: 92 BPM | HEIGHT: 71 IN | TEMPERATURE: 100 F

## 2024-08-25 DIAGNOSIS — E78.5 HYPERLIPIDEMIA, UNSPECIFIED: ICD-10-CM

## 2024-08-25 DIAGNOSIS — Z85.118 PERSONAL HISTORY OF OTHER MALIGNANT NEOPLASM OF BRONCHUS AND LUNG: ICD-10-CM

## 2024-08-25 DIAGNOSIS — I10 ESSENTIAL (PRIMARY) HYPERTENSION: ICD-10-CM

## 2024-08-25 DIAGNOSIS — A41.9 SEPSIS, UNSPECIFIED ORGANISM: ICD-10-CM

## 2024-08-25 DIAGNOSIS — J44.9 CHRONIC OBSTRUCTIVE PULMONARY DISEASE, UNSPECIFIED: ICD-10-CM

## 2024-08-25 DIAGNOSIS — I48.20 CHRONIC ATRIAL FIBRILLATION, UNSPECIFIED: ICD-10-CM

## 2024-08-25 DIAGNOSIS — N40.0 BENIGN PROSTATIC HYPERPLASIA WITHOUT LOWER URINARY TRACT SYMPTOMS: ICD-10-CM

## 2024-08-25 DIAGNOSIS — J18.9 PNEUMONIA, UNSPECIFIED ORGANISM: ICD-10-CM

## 2024-08-25 LAB
ALBUMIN SERPL ELPH-MCNC: 3.6 G/DL — SIGNIFICANT CHANGE UP (ref 3.3–5)
ALP SERPL-CCNC: 73 U/L — SIGNIFICANT CHANGE UP (ref 40–120)
ALT FLD-CCNC: 15 U/L — SIGNIFICANT CHANGE UP (ref 4–41)
ANION GAP SERPL CALC-SCNC: 10 MMOL/L — SIGNIFICANT CHANGE UP (ref 7–14)
APPEARANCE UR: CLEAR — SIGNIFICANT CHANGE UP
APTT BLD: 44 SEC — HIGH (ref 24.5–35.6)
AST SERPL-CCNC: 30 U/L — SIGNIFICANT CHANGE UP (ref 4–40)
BASOPHILS # BLD AUTO: 0.04 K/UL — SIGNIFICANT CHANGE UP (ref 0–0.2)
BASOPHILS NFR BLD AUTO: 0.4 % — SIGNIFICANT CHANGE UP (ref 0–2)
BILIRUB SERPL-MCNC: 1.3 MG/DL — HIGH (ref 0.2–1.2)
BILIRUB UR-MCNC: NEGATIVE — SIGNIFICANT CHANGE UP
BLOOD GAS VENOUS COMPREHENSIVE RESULT: SIGNIFICANT CHANGE UP
BUN SERPL-MCNC: 19 MG/DL — SIGNIFICANT CHANGE UP (ref 7–23)
CALCIUM SERPL-MCNC: 8.2 MG/DL — LOW (ref 8.4–10.5)
CHLORIDE SERPL-SCNC: 106 MMOL/L — SIGNIFICANT CHANGE UP (ref 98–107)
CO2 SERPL-SCNC: 27 MMOL/L — SIGNIFICANT CHANGE UP (ref 22–31)
COLOR SPEC: YELLOW — SIGNIFICANT CHANGE UP
CREAT SERPL-MCNC: 0.89 MG/DL — SIGNIFICANT CHANGE UP (ref 0.5–1.3)
DIFF PNL FLD: NEGATIVE — SIGNIFICANT CHANGE UP
EGFR: 82 ML/MIN/1.73M2 — SIGNIFICANT CHANGE UP
EOSINOPHIL # BLD AUTO: 0.13 K/UL — SIGNIFICANT CHANGE UP (ref 0–0.5)
EOSINOPHIL NFR BLD AUTO: 1.4 % — SIGNIFICANT CHANGE UP (ref 0–6)
FLUAV AG NPH QL: SIGNIFICANT CHANGE UP
FLUBV AG NPH QL: SIGNIFICANT CHANGE UP
GLUCOSE SERPL-MCNC: 104 MG/DL — HIGH (ref 70–99)
GLUCOSE UR QL: NEGATIVE MG/DL — SIGNIFICANT CHANGE UP
HCT VFR BLD CALC: 40.9 % — SIGNIFICANT CHANGE UP (ref 39–50)
HGB BLD-MCNC: 13.4 G/DL — SIGNIFICANT CHANGE UP (ref 13–17)
IANC: 7.29 K/UL — SIGNIFICANT CHANGE UP (ref 1.8–7.4)
IMM GRANULOCYTES NFR BLD AUTO: 0.7 % — SIGNIFICANT CHANGE UP (ref 0–0.9)
INR BLD: 2.66 RATIO — HIGH (ref 0.85–1.18)
KETONES UR-MCNC: NEGATIVE MG/DL — SIGNIFICANT CHANGE UP
LACTATE SERPL-SCNC: 0.8 MMOL/L — SIGNIFICANT CHANGE UP (ref 0.5–2)
LEUKOCYTE ESTERASE UR-ACNC: ABNORMAL
LYMPHOCYTES # BLD AUTO: 0.72 K/UL — LOW (ref 1–3.3)
LYMPHOCYTES # BLD AUTO: 7.9 % — LOW (ref 13–44)
MCHC RBC-ENTMCNC: 31 PG — SIGNIFICANT CHANGE UP (ref 27–34)
MCHC RBC-ENTMCNC: 32.8 GM/DL — SIGNIFICANT CHANGE UP (ref 32–36)
MCV RBC AUTO: 94.7 FL — SIGNIFICANT CHANGE UP (ref 80–100)
MONOCYTES # BLD AUTO: 0.93 K/UL — HIGH (ref 0–0.9)
MONOCYTES NFR BLD AUTO: 10.1 % — SIGNIFICANT CHANGE UP (ref 2–14)
NEUTROPHILS # BLD AUTO: 7.29 K/UL — SIGNIFICANT CHANGE UP (ref 1.8–7.4)
NEUTROPHILS NFR BLD AUTO: 79.5 % — HIGH (ref 43–77)
NITRITE UR-MCNC: NEGATIVE — SIGNIFICANT CHANGE UP
NRBC # BLD: 0 /100 WBCS — SIGNIFICANT CHANGE UP (ref 0–0)
NRBC # FLD: 0 K/UL — SIGNIFICANT CHANGE UP (ref 0–0)
PH UR: 6.5 — SIGNIFICANT CHANGE UP (ref 5–8)
PLATELET # BLD AUTO: 145 K/UL — LOW (ref 150–400)
POTASSIUM SERPL-MCNC: 4.1 MMOL/L — SIGNIFICANT CHANGE UP (ref 3.5–5.3)
POTASSIUM SERPL-SCNC: 4.1 MMOL/L — SIGNIFICANT CHANGE UP (ref 3.5–5.3)
PROT SERPL-MCNC: 6.3 G/DL — SIGNIFICANT CHANGE UP (ref 6–8.3)
PROT UR-MCNC: 100 MG/DL
PROTHROM AB SERPL-ACNC: 29 SEC — HIGH (ref 9.5–13)
RBC # BLD: 4.32 M/UL — SIGNIFICANT CHANGE UP (ref 4.2–5.8)
RBC # FLD: 13.7 % — SIGNIFICANT CHANGE UP (ref 10.3–14.5)
RSV RNA NPH QL NAA+NON-PROBE: SIGNIFICANT CHANGE UP
SARS-COV-2 RNA SPEC QL NAA+PROBE: SIGNIFICANT CHANGE UP
SODIUM SERPL-SCNC: 143 MMOL/L — SIGNIFICANT CHANGE UP (ref 135–145)
SP GR SPEC: 1.02 — SIGNIFICANT CHANGE UP (ref 1–1.03)
TROPONIN T, HIGH SENSITIVITY RESULT: 17 NG/L — SIGNIFICANT CHANGE UP
UROBILINOGEN FLD QL: 1 MG/DL — SIGNIFICANT CHANGE UP (ref 0.2–1)
WBC # BLD: 9.17 K/UL — SIGNIFICANT CHANGE UP (ref 3.8–10.5)
WBC # FLD AUTO: 9.17 K/UL — SIGNIFICANT CHANGE UP (ref 3.8–10.5)

## 2024-08-25 PROCEDURE — 99223 1ST HOSP IP/OBS HIGH 75: CPT

## 2024-08-25 PROCEDURE — 71045 X-RAY EXAM CHEST 1 VIEW: CPT | Mod: 26

## 2024-08-25 PROCEDURE — 99285 EMERGENCY DEPT VISIT HI MDM: CPT | Mod: GC

## 2024-08-25 RX ORDER — METOPROLOL TARTRATE 100 MG/1
50 TABLET ORAL
Refills: 0 | Status: DISCONTINUED | OUTPATIENT
Start: 2024-08-25 | End: 2024-08-27

## 2024-08-25 RX ORDER — BUDESONIDE AND FORMOTEROL FUMARATE 80; 4.5 UG/1; UG/1
2 AEROSOL, METERED RESPIRATORY (INHALATION)
Refills: 0 | Status: DISCONTINUED | OUTPATIENT
Start: 2024-08-25 | End: 2024-08-26

## 2024-08-25 RX ORDER — AZITHROMYCIN 500 MG/1
500 TABLET, FILM COATED ORAL ONCE
Refills: 0 | Status: COMPLETED | OUTPATIENT
Start: 2024-08-25 | End: 2024-08-25

## 2024-08-25 RX ORDER — ERYTHROMYCIN 5 MG/G
1 OINTMENT OPHTHALMIC
Refills: 0 | DISCHARGE

## 2024-08-25 RX ORDER — FLUTICASONE FUROATE AND VILANTEROL TRIFENATATE 200; 25 UG/1; UG/1
1 POWDER RESPIRATORY (INHALATION)
Refills: 0 | DISCHARGE

## 2024-08-25 RX ORDER — AZITHROMYCIN 500 MG/1
500 TABLET, FILM COATED ORAL EVERY 24 HOURS
Refills: 0 | Status: DISCONTINUED | OUTPATIENT
Start: 2024-08-26 | End: 2024-08-26

## 2024-08-25 RX ORDER — AZITHROMYCIN 500 MG/1
TABLET, FILM COATED ORAL
Refills: 0 | Status: DISCONTINUED | OUTPATIENT
Start: 2024-08-25 | End: 2024-08-26

## 2024-08-25 RX ORDER — VANCOMYCIN/0.9 % SOD CHLORIDE 1.75G/25
1000 PLASTIC BAG, INJECTION (ML) INTRAVENOUS ONCE
Refills: 0 | Status: COMPLETED | OUTPATIENT
Start: 2024-08-25 | End: 2024-08-25

## 2024-08-25 RX ORDER — WARFARIN SODIUM 2 MG
2 TABLET ORAL ONCE
Refills: 0 | Status: COMPLETED | OUTPATIENT
Start: 2024-08-26 | End: 2024-08-26

## 2024-08-25 RX ORDER — FINASTERIDE 1 MG/1
5 TABLET, FILM COATED ORAL DAILY
Refills: 0 | Status: DISCONTINUED | OUTPATIENT
Start: 2024-08-25 | End: 2024-08-27

## 2024-08-25 RX ORDER — TAMSULOSIN HYDROCHLORIDE 0.4 MG/1
0.8 CAPSULE ORAL AT BEDTIME
Refills: 0 | Status: DISCONTINUED | OUTPATIENT
Start: 2024-08-25 | End: 2024-08-27

## 2024-08-25 RX ORDER — ACETAMINOPHEN 325 MG/1
975 TABLET ORAL ONCE
Refills: 0 | Status: COMPLETED | OUTPATIENT
Start: 2024-08-25 | End: 2024-08-25

## 2024-08-25 RX ORDER — IPRATROPIUM BROMIDE AND ALBUTEROL SULFATE .5; 3 MG/3ML; MG/3ML
3 SOLUTION RESPIRATORY (INHALATION) ONCE
Refills: 0 | Status: COMPLETED | OUTPATIENT
Start: 2024-08-25 | End: 2024-08-25

## 2024-08-25 RX ORDER — WARFARIN SODIUM 2 MG
0 TABLET ORAL
Refills: 0 | DISCHARGE

## 2024-08-25 RX ORDER — SODIUM CHLORIDE 9 MG/ML
1000 INJECTION INTRAMUSCULAR; INTRAVENOUS; SUBCUTANEOUS ONCE
Refills: 0 | Status: COMPLETED | OUTPATIENT
Start: 2024-08-25 | End: 2024-08-25

## 2024-08-25 RX ORDER — ALFUZOSIN HYDROCHLORIDE 10 MG/1
1 TABLET, EXTENDED RELEASE ORAL
Refills: 0 | DISCHARGE

## 2024-08-25 RX ORDER — SODIUM CHLORIDE 9 MG/ML
2000 INJECTION INTRAMUSCULAR; INTRAVENOUS; SUBCUTANEOUS ONCE
Refills: 0 | Status: DISCONTINUED | OUTPATIENT
Start: 2024-08-25 | End: 2024-08-25

## 2024-08-25 RX ORDER — ASCORBIC ACID/ASCORBATE SODIUM 500 MG
1 TABLET,CHEWABLE ORAL
Refills: 0 | DISCHARGE

## 2024-08-25 RX ORDER — ACETAMINOPHEN 325 MG/1
650 TABLET ORAL EVERY 6 HOURS
Refills: 0 | Status: DISCONTINUED | OUTPATIENT
Start: 2024-08-25 | End: 2024-08-27

## 2024-08-25 RX ORDER — PIPERACILLIN SODIUM AND TAZOBACTAM SODIUM 3; .375 G/15ML; G/15ML
3.38 INJECTION, POWDER, FOR SOLUTION INTRAVENOUS ONCE
Refills: 0 | Status: COMPLETED | OUTPATIENT
Start: 2024-08-25 | End: 2024-08-25

## 2024-08-25 RX ORDER — ASCORBIC ACID/ASCORBATE SODIUM 500 MG
500 TABLET,CHEWABLE ORAL DAILY
Refills: 0 | Status: DISCONTINUED | OUTPATIENT
Start: 2024-08-25 | End: 2024-08-27

## 2024-08-25 RX ADMIN — Medication 20 MILLIGRAM(S): at 22:11

## 2024-08-25 RX ADMIN — FINASTERIDE 5 MILLIGRAM(S): 1 TABLET, FILM COATED ORAL at 22:11

## 2024-08-25 RX ADMIN — PIPERACILLIN SODIUM AND TAZOBACTAM SODIUM 200 GRAM(S): 3; .375 INJECTION, POWDER, FOR SOLUTION INTRAVENOUS at 08:01

## 2024-08-25 RX ADMIN — SODIUM CHLORIDE 1000 MILLILITER(S): 9 INJECTION INTRAMUSCULAR; INTRAVENOUS; SUBCUTANEOUS at 08:01

## 2024-08-25 RX ADMIN — TAMSULOSIN HYDROCHLORIDE 0.8 MILLIGRAM(S): 0.4 CAPSULE ORAL at 22:10

## 2024-08-25 RX ADMIN — Medication 250 MILLIGRAM(S): at 09:00

## 2024-08-25 RX ADMIN — AZITHROMYCIN 500 MILLIGRAM(S): 500 TABLET, FILM COATED ORAL at 16:04

## 2024-08-25 RX ADMIN — Medication 100 MILLIGRAM(S): at 15:33

## 2024-08-25 RX ADMIN — METOPROLOL TARTRATE 50 MILLIGRAM(S): 100 TABLET ORAL at 22:12

## 2024-08-25 RX ADMIN — ACETAMINOPHEN 975 MILLIGRAM(S): 325 TABLET ORAL at 10:00

## 2024-08-25 RX ADMIN — ACETAMINOPHEN 975 MILLIGRAM(S): 325 TABLET ORAL at 09:40

## 2024-08-25 RX ADMIN — IPRATROPIUM BROMIDE AND ALBUTEROL SULFATE 3 MILLILITER(S): .5; 3 SOLUTION RESPIRATORY (INHALATION) at 08:01

## 2024-08-25 NOTE — PATIENT PROFILE ADULT - FALL HARM RISK - HARM RISK INTERVENTIONS

## 2024-08-25 NOTE — ED PROVIDER NOTE - PHYSICAL EXAMINATION
GENERAL: Awake, alert, tachypneic  HEENT: NC/AT, moist mucous membranes, PERRL, EOMI, erythema of the left sclera, no drainage or discharge  LUNGS: + wheezing in all lung fields.  CARDIAC: RRR, no m/r/g  ABDOMEN: Soft, non tender, non distended, no rebound, no guarding  BACK: No midline spinal tenderness, no CVA tenderness  EXT: No edema, no calf tenderness, 2+ DP pulses bilaterally, no deformities.  NEURO: A&Ox3. Moving all extremities.  SKIN: Warm and dry. No rash.  PSYCH: Normal affect.

## 2024-08-25 NOTE — ED ADULT NURSE NOTE - NSFALLHARMRISKINTERV_ED_ALL_ED

## 2024-08-25 NOTE — PATIENT PROFILE ADULT - FUNCTIONAL ASSESSMENT - BASIC MOBILITY 6.
3 = A little assistance
You can access the FollowMyHealth Patient Portal offered by [facility] by registering at the following website: [email or web address]. By joining [employer]’s FollowMyHealth portal, you will also be able to view your health information using other applications (apps) compatible with our system.

## 2024-08-25 NOTE — H&P ADULT - PROBLEM SELECTOR PLAN 7
reports he's in remission, s/p 6 cycles of radiation at [facility] in [place], outpt f/up onc Dr. Irizarry

## 2024-08-25 NOTE — PATIENT PROFILE ADULT - NSPROPASSIVESMOKEEXPOSURE_GEN_A_NUR
Current Eye Medications:  Systane prn     Subjective:  Pt reports that yesterday  her right lower lid became red, inflamed and sore. Pt reports avoids using systane because they make her eyes feel worse.     Objective:  See Ophthalmology Exam.       Assessment:  Acute chalazion right lower lid.      Plan:  Warm packs three times daily.   Keflex: 500 mg one capsule three times daily.  Return visit as needed.    Johnny Calzada M.D.      
Unknown

## 2024-08-25 NOTE — PHARMACOTHERAPY INTERVENTION NOTE - COMMENTS
Medication history is complete. Medication list updated in Outpatient Medication Record (OMR). Sources:Patient, HIE    Home Medications:  Breo Ellipta 200 mcg-25 mcg/inh inhalation powder: 1 puff(s) inhaled once a day (25 Aug 2024 15:55)  erythromycin 0.5% ophthalmic ointment: 1 gram(s) in the left eye once a day (at bedtime) (25 Aug 2024 15:55)  finasteride 5 mg oral tablet: 1 tab(s) orally once a day (25 Aug 2024 15:55)  Hiprex 1 g oral tablet: 1 tab(s) orally once a day ...(takes with Vitamin C 500mg) (25 Aug 2024 15:55)  metoprolol succinate 50 mg oral tablet, extended release: 1 tab(s) orally 2 times a day (25 Aug 2024 15:55)  simvastatin 40 mg oral tablet: 1 tab(s) orally once a day (at bedtime) (25 Aug 2024 15:55)  Uroxatral 10 mg oral tablet, extended release: 1 tab(s) orally once a day (at bedtime) (25 Aug 2024 15:55)  Vitamin C 500 mg oral tablet, chewable: 1 tab(s) chewed once a day ...(takes with Hiprex 1 g) (25 Aug 2024 15:55)  warfarin 2 mg oral tablet: 2mg on day 1, 2mg on day 2, 1mg on day 3, then repeat (25 Aug 2024 15:57)

## 2024-08-25 NOTE — H&P ADULT - PROBLEM SELECTOR PLAN 1
- pt with fever/chills, CXR EMILIO patchy opacity c/w Pneumonia  - given vanco/zosyn in ED, blood culture sent  - start Ceftriaxone/zithromax  - check urine Legionella and urine strep  - send sputum culture if pt can produce  - wean O2 as tolerated, on 2L nc

## 2024-08-25 NOTE — H&P ADULT - NSHPREVIEWOFSYSTEMS_GEN_ALL_CORE
CONSTITUTIONAL: +fever/chills, no weight loss, or fatigue  EYES: No eye pain, visual disturbances, or discharge  ENMT:  No difficulty hearing, tinnitus, vertigo; No sinus or throat pain  NECK: No pain or stiffness  BREASTS: No pain, masses, or nipple discharge  RESPIRATORY: + cough, no wheezing, +chills no hemoptysis; + shortness of breath  CARDIOVASCULAR: No chest pain, palpitations, dizziness, or leg swelling  GASTROINTESTINAL: No abdominal or epigastric pain. No nausea, vomiting, or hematemesis; No diarrhea or constipation. No melena or hematochezia.  GENITOURINARY: No dysuria, frequency, hematuria, or incontinence  NEUROLOGICAL: No headaches, memory loss, loss of strength, numbness, or tremors  SKIN: No itching, burning, rashes, or lesions   LYMPH NODES: No enlarged glands  ENDOCRINE: No heat or cold intolerance; No hair loss  MUSCULOSKELETAL: No muscle or back pain  PSYCHIATRIC: No depression, anxiety, mood swings, or difficulty sleeping  HEME/LYMPH: No easy bruising, or bleeding gums  ALLERGY AND IMMUNOLOGIC: No hives or eczema

## 2024-08-25 NOTE — ED PROVIDER NOTE - OBJECTIVE STATEMENT
see MDM. see ANEL.    DD ED ATTM HTN HLD afib on coumadin, lung CA in remission, p/w fast breathing, SOB, chills that woke him up this morning.  No chest pain or cough.  USOH yesterday.  VS LG temp, hypoxia to low 90's on RA impv with 2L NC to 98%.  Wheezing on exam.  Tachypneic.  Appears dry.  Nontender abd.  No LE swelling or edema.  No rash.  Concern for sepsis, probably pna given hypoxia.  Check labs, CXR, urine, cultures, rx abx, nebs, admit given hypoxia and tachypnea, age.

## 2024-08-25 NOTE — ED PROVIDER NOTE - CLINICAL SUMMARY MEDICAL DECISION MAKING FREE TEXT BOX
89-year-old male history of HTN, HLD, A-fib on Coumadin presenting for difficulty breathing and chills that woke him up abruptly this morning.  The patient states prior to this he was feeling normal.  The patient denies any chest pain with his symptoms.  The patient denies any recent sick exposures, cough, runny nose, sore throat.  The patient reports that he was shaking profusely this morning, denies any diaphoresis with his symptoms, denies any abdominal pain, urinary symptoms, or diarrhea. The patient states that he had a normal bowel movement this morning.  The patient denies any recent travel. ROS is otherwise negative. Currently vital signs stable.  However heart rate 92, temperature 100 orally, will obtain a rectal temperature as there is concern for sepsis.  Currently the sepsis is of unknown etiology.  PE.  Wheezing in all lung fields. Tachypneic. Injected sclera. Differential includes but is not limited to sepsis secondary to pneumonia, urinary tract infection, patient with no abdominal pain, lower concern for intra-abdominal pathology at this time, patient with no diarrhea, low concern for a gastritis. Will obtain basic labs, blood cultures, UA, UC, chest xray, will give fluids and empiric abx. Final disposition pending labs imaging and reassessment. Likely admission.

## 2024-08-25 NOTE — ED ADULT NURSE NOTE - OBJECTIVE STATEMENT
Pt with co feeling weak with chills and sob. pt with no co chest pain, afib on cardiac monitor b/p wnl,  pt with iv to left a/c medication infusing per MD orders. pt given call bell instructed on hw to call for assistance. pt placed on nasal canula 02 3 liter 02 sat %. Pt with no co nausea. pt looks comfortable at this time . Pts wife at bedside, awaiting further testing.

## 2024-08-25 NOTE — ED PROVIDER NOTE - PROGRESS NOTE DETAILS
Tiffany Velázquez, PGY-3 DO:  SEPSIS of unknown etiology, Patient TBA. Patient agreeable with plan. UA slightly positive.

## 2024-08-25 NOTE — H&P ADULT - NSHPLABSRESULTS_GEN_ALL_CORE
LABS:                        13.4   9.17  )-----------( 145      ( 25 Aug 2024 08:07 )             40.9         143  |  106  |  19  ----------------------------<  104<H>  4.1   |  27  |  0.89    Ca    8.2<L>      25 Aug 2024 08:07    TPro  6.3  /  Alb  3.6  /  TBili  1.3<H>  /  DBili  x   /  AST  30  /  ALT  15  /  AlkPhos  73        PT/INR - ( 25 Aug 2024 08:07 )   PT: 29.0 sec;   INR: 2.66 ratio         PTT - ( 25 Aug 2024 08:07 )  PTT:44.0 sec      Urinalysis Basic - ( 25 Aug 2024 12:08 )    Color: Yellow / Appearance: Clear / S.023 / pH: x  Gluc: x / Ketone: Negative mg/dL  / Bili: Negative / Urobili: 1.0 mg/dL   Blood: x / Protein: 100 mg/dL / Nitrite: Negative   Leuk Esterase: Small / RBC: 10 /HPF / WBC 14 /HPF   Sq Epi: x / Non Sq Epi: 4 /HPF / Bacteria: Negative /HPF    VBG  @ 08:14  pH: 7.35/pCO2: 54 /pO2: 37/HCO3: 30/lactate: 0.9      Urinalysis with Rflx Culture (collected 25 Aug 2024 12:08)    EKG: personally reviewed, Afibhr 83, no acute ischemic st/t change, Left axis deviation    RADIOLOGY & ADDITIONAL TESTS:  < from: Xray Chest 1 View- PORTABLE-Urgent (24 @ 08:54) >    IMPRESSION:  Left upper lung patchy opacity compatible with pneumonia. Recommend   follow-up to resolution.

## 2024-08-25 NOTE — ED ADULT TRIAGE NOTE - CHIEF COMPLAINT QUOTE
Pt arrives to ED waking at 04:00 shivering with SOB.  Pt was 94% on room air, arrives on 4L Oxygen via nasal cannula by EMS.  Pt is 100.0F in triage.  Hx: Afib on Warfarin, HTN, lung cancer in remission., HLD

## 2024-08-25 NOTE — H&P ADULT - NSHPPHYSICALEXAM_GEN_ALL_CORE
Vital Signs Last 24 Hrs  T(C): 36.4 (25 Aug 2024 15:17), Max: 38.3 (25 Aug 2024 08:37)  T(F): 97.6 (25 Aug 2024 15:17), Max: 100.9 (25 Aug 2024 08:37)  HR: 86 (25 Aug 2024 15:17) (76 - 99)  BP: 129/79 (25 Aug 2024 15:17) (129/79 - 138/88)  BP(mean): --  RR: 20 (25 Aug 2024 15:17) (20 - 22)  SpO2: 98% (25 Aug 2024 15:17) (98% - 99%)    Parameters below as of 25 Aug 2024 15:17  Patient On (Oxygen Delivery Method): nasal cannula  O2 Flow (L/min): 2    CAPILLARY BLOOD GLUCOSE        I&O's Summary      PHYSICAL EXAM:  GENERAL: nad  HEAD:  Atraumatic, Normocephalic  EYES: EOMI, PERRLA, conjunctiva and sclera clear  NECK: Supple, No JVD  CHEST/LUNG: mainly clear, slight crackle right upper lung  HEART: irregularly irregular rhythm; No murmurs, rubs, or gallops  ABDOMEN: Soft, Nontender, Nondistended; Bowel sounds present  EXTREMITIES:  2+ Peripheral Pulses, No clubbing, cyanosis, or edema  PSYCH: AAOx3  NEUROLOGY: non-focal  SKIN: No rashes or lesions

## 2024-08-25 NOTE — H&P ADULT - HISTORY OF PRESENT ILLNESS
89 y/o male with hx of HTN, HLD, AFib on coumadin, COPD, lung CA s/p RT-in remission, presents with 1 day hx of shaking chills (rigor), SOB with labored breathing/tachypnea that woke him up this morning, denies chest pain/gi or gu symptoms, has slight cough but no purulent sputum, no sick contact, reports recently in [facility] for a day for hematuria requiring CBI that cleared up his urine.     In ED arrival, he was on 4L oxygen via NC, satting 94%n triage, CXR demonstrated EMILIO infiltrate c/f pneumonia. Vitals temp febrile to 100.9F, concern for sepsis d/t pneumonia, blood cultures sent, he was given 3L NS fluid bolus, tylenol, duoneb x1. lab work WBc 9.1, hb 13.4, INR 2.66, Cr 0.89, TBili 1.3, VBG pH 7.35 , lactate 0.9, UA small LE, wbc 14.   Currently, pt feels better, breathing improved, on 2L nc.

## 2024-08-25 NOTE — H&P ADULT - PROBLEM SELECTOR PLAN 3
c/w warfarin 2mg/2mg/1mg, last took 1mg yesterday, reluctant to take evening dose tonight for fear of overlapping with morning dose when he's back home  will dose 2mg tomorrow 7am  daily PT/INR , aim INR 2-3  currently therapeutic at 2.66  TTE from 7/2022 showed LVEF 56%, mod MR, mild AI  outpt f/up EP Dr. Carbajal c/w warfarin 2mg/2mg/1mg takes it in the morning, last took 1mg yesterday, reluctant to take evening dose tonight for fear of overlapping with morning dose when he's back home  will dose 2mg tomorrow 7am  daily PT/INR , aim INR 2-3  currently therapeutic at 2.66  TTE from 7/2022 showed LVEF 56%, mod MR, mild AI  outpt f/up EP Dr. Carbajal

## 2024-08-25 NOTE — ED ADULT NURSE REASSESSMENT NOTE - NS ED NURSE REASSESS COMMENT FT1
Received report from ROMMEL Felix. Pt is awake, a&o x 3, appears comfortable and in no apparent distress. On 3L oxygen via NC and tolerating well. Respirations equal and unlabored. No headache, nausea, dizziness, vomiting, SOB, fever or chills. Pt has 20g iv placed to right AC with no redness or swelling noted. Awaiting further orders.

## 2024-08-25 NOTE — ED PROVIDER NOTE - ATTENDING CONTRIBUTION TO CARE
89M HTN HLD afib on coumadin, lung CA in remission, p/w fast breathing, SOB, chills that woke him up this morning.  No chest pain or cough.  USOH yesterday.  VS LG temp, hypoxia to low 90's on RA impv with 2L NC to 98%.  Wheezing on exam.  Tachypneic.  Appears dry.  Nontender abd.  No LE swelling or edema.  No rash.  Concern for sepsis, probably pna given hypoxia.  Check labs, CXR, urine, cultures, rx abx, nebs, admit given hypoxia and tachypnea, age.    VS:  LG temp, hypoxia to low 90's RA impv to 98% 2LNC, tachypnea    GEN - malaise, mild distress SOB but able to speak in complete sentences.;   A+O x3   HEAD - NC/AT     ENT - PEERL, EOMI, mucous membranes  dry , no discharge      NECK: Neck supple, non-tender without lymphadenopathy, no masses, no JVD  PULM - bilat wheeze  symmetric breath sounds  COR -  normal heart sounds    ABD - , ND, NT, soft,  BACK - no CVA tenderness, nontender spine     EXTREMS - no edema, no deformity, warm and well perfused    SKIN - no rash    or bruising      NEUROLOGIC - alert, face symmetric, speech fluent, sensation nl, motor no focal deficit.

## 2024-08-25 NOTE — ED PROVIDER NOTE - NSICDXFAMILYHX_GEN_ALL_CORE_FT
FAMILY HISTORY:  No pertinent family history in first degree relatives     FAMILY HISTORY:  Father  Still living? Unknown  FH: kidney disease, Age at diagnosis: Age Unknown

## 2024-08-26 LAB
ANION GAP SERPL CALC-SCNC: 11 MMOL/L — SIGNIFICANT CHANGE UP (ref 7–14)
BASOPHILS # BLD AUTO: 0.04 K/UL — SIGNIFICANT CHANGE UP (ref 0–0.2)
BASOPHILS NFR BLD AUTO: 0.6 % — SIGNIFICANT CHANGE UP (ref 0–2)
BUN SERPL-MCNC: 15 MG/DL — SIGNIFICANT CHANGE UP (ref 7–23)
CALCIUM SERPL-MCNC: 8.1 MG/DL — LOW (ref 8.4–10.5)
CHLORIDE SERPL-SCNC: 104 MMOL/L — SIGNIFICANT CHANGE UP (ref 98–107)
CO2 SERPL-SCNC: 23 MMOL/L — SIGNIFICANT CHANGE UP (ref 22–31)
CREAT SERPL-MCNC: 0.72 MG/DL — SIGNIFICANT CHANGE UP (ref 0.5–1.3)
EGFR: 87 ML/MIN/1.73M2 — SIGNIFICANT CHANGE UP
EOSINOPHIL # BLD AUTO: 0.21 K/UL — SIGNIFICANT CHANGE UP (ref 0–0.5)
EOSINOPHIL NFR BLD AUTO: 2.9 % — SIGNIFICANT CHANGE UP (ref 0–6)
GLUCOSE SERPL-MCNC: 94 MG/DL — SIGNIFICANT CHANGE UP (ref 70–99)
HCT VFR BLD CALC: 38.2 % — LOW (ref 39–50)
HGB BLD-MCNC: 12.8 G/DL — LOW (ref 13–17)
IANC: 4.84 K/UL — SIGNIFICANT CHANGE UP (ref 1.8–7.4)
IMM GRANULOCYTES NFR BLD AUTO: 0.7 % — SIGNIFICANT CHANGE UP (ref 0–0.9)
INR BLD: 2.15 RATIO — HIGH (ref 0.85–1.18)
LEGIONELLA AG UR QL: NEGATIVE — SIGNIFICANT CHANGE UP
LYMPHOCYTES # BLD AUTO: 1.11 K/UL — SIGNIFICANT CHANGE UP (ref 1–3.3)
LYMPHOCYTES # BLD AUTO: 15.5 % — SIGNIFICANT CHANGE UP (ref 13–44)
MAGNESIUM SERPL-MCNC: 1.5 MG/DL — LOW (ref 1.6–2.6)
MCHC RBC-ENTMCNC: 31.1 PG — SIGNIFICANT CHANGE UP (ref 27–34)
MCHC RBC-ENTMCNC: 33.5 GM/DL — SIGNIFICANT CHANGE UP (ref 32–36)
MCV RBC AUTO: 92.7 FL — SIGNIFICANT CHANGE UP (ref 80–100)
MONOCYTES # BLD AUTO: 0.92 K/UL — HIGH (ref 0–0.9)
MONOCYTES NFR BLD AUTO: 12.8 % — SIGNIFICANT CHANGE UP (ref 2–14)
NEUTROPHILS # BLD AUTO: 4.84 K/UL — SIGNIFICANT CHANGE UP (ref 1.8–7.4)
NEUTROPHILS NFR BLD AUTO: 67.5 % — SIGNIFICANT CHANGE UP (ref 43–77)
NRBC # BLD: 0 /100 WBCS — SIGNIFICANT CHANGE UP (ref 0–0)
NRBC # FLD: 0 K/UL — SIGNIFICANT CHANGE UP (ref 0–0)
PHOSPHATE SERPL-MCNC: 2.1 MG/DL — LOW (ref 2.5–4.5)
PLATELET # BLD AUTO: 145 K/UL — LOW (ref 150–400)
POTASSIUM SERPL-MCNC: 3.8 MMOL/L — SIGNIFICANT CHANGE UP (ref 3.5–5.3)
POTASSIUM SERPL-SCNC: 3.8 MMOL/L — SIGNIFICANT CHANGE UP (ref 3.5–5.3)
PROTHROM AB SERPL-ACNC: 23.5 SEC — HIGH (ref 9.5–13)
RBC # BLD: 4.12 M/UL — LOW (ref 4.2–5.8)
RBC # FLD: 13.5 % — SIGNIFICANT CHANGE UP (ref 10.3–14.5)
S PNEUM AG UR QL: NEGATIVE — SIGNIFICANT CHANGE UP
SODIUM SERPL-SCNC: 138 MMOL/L — SIGNIFICANT CHANGE UP (ref 135–145)
WBC # BLD: 7.17 K/UL — SIGNIFICANT CHANGE UP (ref 3.8–10.5)
WBC # FLD AUTO: 7.17 K/UL — SIGNIFICANT CHANGE UP (ref 3.8–10.5)

## 2024-08-26 PROCEDURE — 71250 CT THORAX DX C-: CPT | Mod: 26

## 2024-08-26 RX ORDER — SODIUM PHOSPHATE, DIBASIC, ANHYDROUS, POTASSIUM PHOSPHATE, MONOBASIC, AND SODIUM PHOSPHATE, MONOBASIC, MONOHYDRATE 852; 155; 130 MG/1; MG/1; MG/1
1 TABLET, COATED ORAL ONCE
Refills: 0 | Status: COMPLETED | OUTPATIENT
Start: 2024-08-26 | End: 2024-08-26

## 2024-08-26 RX ORDER — BUDESONIDE AND FORMOTEROL FUMARATE 80; 4.5 UG/1; UG/1
2 AEROSOL, METERED RESPIRATORY (INHALATION)
Refills: 0 | Status: DISCONTINUED | OUTPATIENT
Start: 2024-08-26 | End: 2024-08-26

## 2024-08-26 RX ORDER — IPRATROPIUM BROMIDE AND ALBUTEROL SULFATE .5; 3 MG/3ML; MG/3ML
3 SOLUTION RESPIRATORY (INHALATION) EVERY 6 HOURS
Refills: 0 | Status: DISCONTINUED | OUTPATIENT
Start: 2024-08-26 | End: 2024-08-27

## 2024-08-26 RX ORDER — FLUTICASONE FUROATE AND VILANTEROL TRIFENATATE 200; 25 UG/1; UG/1
1 POWDER RESPIRATORY (INHALATION) DAILY
Refills: 0 | Status: DISCONTINUED | OUTPATIENT
Start: 2024-08-26 | End: 2024-08-27

## 2024-08-26 RX ADMIN — Medication 20 MILLIGRAM(S): at 22:12

## 2024-08-26 RX ADMIN — Medication 100 GRAM(S): at 09:52

## 2024-08-26 RX ADMIN — IPRATROPIUM BROMIDE AND ALBUTEROL SULFATE 3 MILLILITER(S): .5; 3 SOLUTION RESPIRATORY (INHALATION) at 14:25

## 2024-08-26 RX ADMIN — FLUTICASONE FUROATE AND VILANTEROL TRIFENATATE 1 PUFF(S): 200; 25 POWDER RESPIRATORY (INHALATION) at 12:58

## 2024-08-26 RX ADMIN — FINASTERIDE 5 MILLIGRAM(S): 1 TABLET, FILM COATED ORAL at 12:58

## 2024-08-26 RX ADMIN — Medication 2 MILLIGRAM(S): at 07:19

## 2024-08-26 RX ADMIN — TAMSULOSIN HYDROCHLORIDE 0.8 MILLIGRAM(S): 0.4 CAPSULE ORAL at 22:12

## 2024-08-26 RX ADMIN — METOPROLOL TARTRATE 50 MILLIGRAM(S): 100 TABLET ORAL at 05:07

## 2024-08-26 RX ADMIN — Medication 100 MILLIGRAM(S): at 15:26

## 2024-08-26 RX ADMIN — SODIUM PHOSPHATE, DIBASIC, ANHYDROUS, POTASSIUM PHOSPHATE, MONOBASIC, AND SODIUM PHOSPHATE, MONOBASIC, MONOHYDRATE 1 PACKET(S): 852; 155; 130 TABLET, COATED ORAL at 09:53

## 2024-08-26 RX ADMIN — METOPROLOL TARTRATE 50 MILLIGRAM(S): 100 TABLET ORAL at 18:03

## 2024-08-26 NOTE — CONSULT NOTE ADULT - ASSESSMENT
89 y/o M PMhx HTN, HLD, AFib on coumadin, COPD, lung CA s/p RT-in remission who presented w/ chills, and SOB     PNA, fever  acute hypoxic resp failure- resolved  febrile to 100.9 on admission  COVID/ flu/ RSV negative  CXR- L upper lung patchy opacity compatible with pneumonia    Recommendations  c/w ceftriaxone 1g daily  c/w azithromycin 500mg daily  - if legionella urine Ag negative discontinue azithromycin  f/u blood cx  CT chest pending    José Miguel Camp M.D.  [facility], Division of Infectious Diseases  [phone]  After 5pm on weekdays and all day on weekends - please call [phone]

## 2024-08-26 NOTE — CONSULT NOTE ADULT - SUBJECTIVE AND OBJECTIVE BOX
08-26-24 @ 10:40    Patient is a 89y old  Male who presents with a chief complaint of pneumonia (25 Aug 2024 15:19)      HPI:  88 y/o male with hx of HTN, HLD, AFib on coumadin, COPD, lung CA s/p RT-in remission, presents with 1 day hx of shaking chills (rigor), SOB with labored breathing/tachypnea that woke him up this morning, denies chest pain/gi or gu symptoms, has slight cough but no purulent sputum, no sick contact, reports recently in [facility] for a day for hematuria requiring CBI that cleared up his urine.   In ED arrival, he was on 4L oxygen via NC, satting 94%n triage, CXR demonstrated EMILIO infiltrate c/f pneumonia. Vitals temp febrile to 100.9F, concern for sepsis d/t pneumonia, blood cultures sent, he was given 3L NS fluid bolus, tylenol, duoneb x1. lab work WBc 9.1, hb 13.4, INR 2.66, Cr 0.89, TBili 1.3, VBG pH 7.35 , lactate 0.9, UA small LE, wbc 14.   Currently, pt feels better, breathing improved, on 2L nc.   (25 Aug 2024 15:19)      ?FOLLOWING PRESENT  [ ] Hx of PE/DVT, [ ] Hx COPD, [ ] Hx of Asthma, [ ] Hx of Hospitalization, [ ]  Hx of BiPAP/CPAP use, [ ] Hx of SOULEYMANE    Allergies    Sulfur (Other)  no PCN allergy (Unknown)    Intolerances        PAST MEDICAL & SURGICAL HISTORY:  AF (Atrial Fibrillation)  x 4-5 years      Hypercholesterolemia      HTN (hypertension)      Enlarged prostate      Hx of tonsillectomy          FAMILY HISTORY:  FH: kidney disease (Father)        Social History: [  ] TOBACCO                  [  ] ETOH                                 [  ] IVDA/DRUGS    REVIEW OF SYSTEMS      General:	    Skin/Breast:  	  Ophthalmologic:  	  ENMT:	    Respiratory and Thorax:  	  Cardiovascular:	    Gastrointestinal:	    Genitourinary:	    Musculoskeletal:	    Neurological:	    Psychiatric:	    Hematology/Lymphatics:	    Endocrine:	    Allergic/Immunologic:	    MEDICATIONS  (STANDING):  albuterol/ipratropium for Nebulization 3 milliLiter(s) Nebulizer every 6 hours  ascorbic acid 500 milliGRAM(s) Oral daily  atorvastatin 20 milliGRAM(s) Oral at bedtime  azithromycin  IVPB      azithromycin  IVPB 500 milliGRAM(s) IV Intermittent every 24 hours  budesonide 160 MICROgram(s)/formoterol 4.5 MICROgram(s) Inhaler 2 Puff(s) Inhalation two times a day  cefTRIAXone   IVPB 1000 milliGRAM(s) IV Intermittent every 24 hours  finasteride 5 milliGRAM(s) Oral daily  metoprolol succinate ER 50 milliGRAM(s) Oral two times a day  tamsulosin 0.8 milliGRAM(s) Oral at bedtime    MEDICATIONS  (PRN):  acetaminophen     Tablet .. 650 milliGRAM(s) Oral every 6 hours PRN Temp greater or equal to 38C (100.4F), Mild Pain (1 - 3)       Vital Signs Last 24 Hrs  T(C): 36.5 (26 Aug 2024 09:00), Max: 36.7 (25 Aug 2024 23:28)  T(F): 97.7 (26 Aug 2024 09:00), Max: 98 (25 Aug 2024 23:28)  HR: 66 (26 Aug 2024 09:00) (66 - 96)  BP: 126/74 (26 Aug 2024 09:00) (126/74 - 155/90)  BP(mean): --  RR: 18 (26 Aug 2024 09:00) (17 - 22)  SpO2: 96% (26 Aug 2024 09:00) (93% - 99%)    Parameters below as of 26 Aug 2024 09:00  Patient On (Oxygen Delivery Method): room air    Orthostatic VS          I&O's Summary      Physical Exam:   GENERAL: NAD, well-groomed, well-developed  HEENT: JONATHAN/   Atraumatic, Normocephalic  ENMT: No tonsillar erythema, exudates, or enlargement; Moist mucous membranes, Good dentition, No lesions  NECK: Supple, No JVD, Normal thyroid  CHEST/LUNG: Clear to auscultation bilaterally; No rales, rhonchi, wheezing, or rubs  CVS: Regular rate and rhythm; No murmurs, rubs, or gallops  GI: : Soft, Nontender, Nondistended; Bowel sounds present  NERVOUS SYSTEM:  Alert & Oriented X3, Good concentration; Motor Strength 5/5 B/L upper and lower extremities; DTRs 2+ intact and symmetric  EXTREMITIES:  2+ Peripheral Pulses, No clubbing, cyanosis, or edema  LYMPH: No lymphadenopathy noted  SKIN: No rashes or lesions  ENDOCRINOLOGY: No Thyromegaly  PSYCH: Appropriate    Labs:  2.9<54<4>>37<<7.355>>2.9<<3><<4><<5<<379>>                            12.8   7.17  )-----------( 145      ( 26 Aug 2024 06:15 )             38.2                         13.4   9.17  )-----------( 145      ( 25 Aug 2024 08:07 )             40.9     08-26    138  |  104  |  15  ----------------------------<  94  3.8   |  23  |  0.72  08-25    143  |  106  |  19  ----------------------------<  104<H>  4.1   |  27  |  0.89    Ca    8.1<L>      26 Aug 2024 06:15  Ca    8.2<L>      25 Aug 2024 08:07  Phos  2.1     08-26  Mg     1.50     08-26    TPro  6.3  /  Alb  3.6  /  TBili  1.3<H>  /  DBili  x   /  AST  30  /  ALT  15  /  AlkPhos  73  08-25    CAPILLARY BLOOD GLUCOSE        LIVER FUNCTIONS - ( 25 Aug 2024 08:07 )  Alb: 3.6 g/dL / Pro: 6.3 g/dL / ALK PHOS: 73 U/L / ALT: 15 U/L / AST: 30 U/L / GGT: x           PT/INR - ( 26 Aug 2024 06:15 )   PT: 23.5 sec;   INR: 2.15 ratio         PTT - ( 25 Aug 2024 08:07 )  PTT:44.0 sec  Urinalysis Basic - ( 26 Aug 2024 06:15 )    Color: x / Appearance: x / SG: x / pH: x  Gluc: 94 mg/dL / Ketone: x  / Bili: x / Urobili: x   Blood: x / Protein: x / Nitrite: x   Leuk Esterase: x / RBC: x / WBC x   Sq Epi: x / Non Sq Epi: x / Bacteria: x      Urinalysis with Rflx Culture (collected 25 Aug 2024 12:08)      D DImerLactate, Blood: 0.8 mmol/L (08-25 @ 08:07)    rad< from: Xray Chest 1 View- PORTABLE-Urgent (08.25.24 @ 08:54) >  The heart is normal in size.    Left upper lung patchy opacity. The right lung is clear. No pneumothorax.   No pleural effusion.    No acute osseous abnormalities. Degenerative changes of the spine.    IMPRESSION:  Left upper lung patchy opacity compatible with pneumonia. Recommend   follow-up to resolution.    --- End of Report ---            ALIE TSE DO; Attending Radiologist  This document has been electronically signed. Aug 25 2024 10:30AM    < end of copied text >      Studies  Chest X-RAY  CT SCAN Chest   CT Abdomen  Venous Dopplers: LE:   Others                     08-26-24 @ 10:40    Patient is a 89y old  Male who presents with a chief complaint of pneumonia (25 Aug 2024 15:19)      HPI:  88 y/o male with hx of HTN, HLD, AFib on coumadin, COPD, lung CA s/p RT-in remission, presents with 1 day hx of shaking chills (rigor), SOB with labored breathing/tachypnea that woke him up this morning, denies chest pain/gi or gu symptoms, has slight cough but no purulent sputum, no sick contact, reports recently in [facility] for a day for hematuria requiring CBI that cleared up his urine.   In ED arrival, he was on 4L oxygen via NC, satting 94%n triage, CXR demonstrated EMILIO infiltrate c/f pneumonia. Vitals temp febrile to 100.9F, concern for sepsis d/t pneumonia, blood cultures sent, he was given 3L NS fluid bolus, tylenol, duoneb x1. lab work WBc 9.1, hb 13.4, INR 2.66, Cr 0.89, TBili 1.3, VBG pH 7.35 , lactate 0.9, UA small LE, wbc 14.   Currently, pt feels better, breathing improved, on 2L nc.   (25 Aug 2024 15:19)  he has been wheezing and hence pulm called:  he has copd and used to smoke before:  he also has lung caner and have been followed regularly by oncologist ;       ?FOLLOWING PRESENT  [x ] Hx of PE/DVT, [y ] Hx COPD, [x ] Hx of Asthma, [ y] Hx of Hospitalization, [ x]  Hx of BiPAP/CPAP use, [ x] Hx of SOULEYMANE    Allergies    Sulfur (Other)  no PCN allergy (Unknown)    Intolerances        PAST MEDICAL & SURGICAL HISTORY:  AF (Atrial Fibrillation)  x 4-5 years      Hypercholesterolemia      HTN (hypertension)      Enlarged prostate      Hx of tonsillectomy          FAMILY HISTORY:  FH: kidney disease (Father)        Social History: [30  k yr   ] TOBACCO                  [ x ] ETOH                                 [  x] IVDA/DRUGS    REVIEW OF SYSTEMS      General:	x    Skin/Breast:x  	  Ophthalmologic:x  	  ENMT:	x    Respiratory and Thorax:  sob,  wheezing  	  Cardiovascular:	x    Gastrointestinal:	x    Genitourinary:	x    Musculoskeletal:	x    Neurological:	x    Psychiatric:	x    Hematology/Lymphatics:	x    Endocrine:	x    Allergic/Immunologic:	x    MEDICATIONS  (STANDING):  albuterol/ipratropium for Nebulization 3 milliLiter(s) Nebulizer every 6 hours  ascorbic acid 500 milliGRAM(s) Oral daily  atorvastatin 20 milliGRAM(s) Oral at bedtime  azithromycin  IVPB      azithromycin  IVPB 500 milliGRAM(s) IV Intermittent every 24 hours  budesonide 160 MICROgram(s)/formoterol 4.5 MICROgram(s) Inhaler 2 Puff(s) Inhalation two times a day  cefTRIAXone   IVPB 1000 milliGRAM(s) IV Intermittent every 24 hours  finasteride 5 milliGRAM(s) Oral daily  metoprolol succinate ER 50 milliGRAM(s) Oral two times a day  tamsulosin 0.8 milliGRAM(s) Oral at bedtime    MEDICATIONS  (PRN):  acetaminophen     Tablet .. 650 milliGRAM(s) Oral every 6 hours PRN Temp greater or equal to 38C (100.4F), Mild Pain (1 - 3)       Vital Signs Last 24 Hrs  T(C): 36.5 (26 Aug 2024 09:00), Max: 36.7 (25 Aug 2024 23:28)  T(F): 97.7 (26 Aug 2024 09:00), Max: 98 (25 Aug 2024 23:28)  HR: 66 (26 Aug 2024 09:00) (66 - 96)  BP: 126/74 (26 Aug 2024 09:00) (126/74 - 155/90)  BP(mean): --  RR: 18 (26 Aug 2024 09:00) (17 - 22)  SpO2: 96% (26 Aug 2024 09:00) (93% - 99%)    Parameters below as of 26 Aug 2024 09:00  Patient On (Oxygen Delivery Method): room air    Orthostatic VS          I&O's Summary      Physical Exam:   GENERAL: NAD, well-groomed, well-developed  HEENT: JONATHAN/   Atraumatic, Normocephalic  ENMT: No tonsillar erythema, exudates, or enlargement; Moist mucous membranes, Good dentition, No lesions  NECK: Supple, No JVD, Normal thyroid  CHEST/LUNG: lucio wheezing:   CVS: Regular rate and rhythm; No murmurs, rubs, or gallops  GI: : Soft, Nontender, Nondistended; Bowel sounds present  NERVOUS SYSTEM:  Alert & Oriented X3  EXTREMITIES: - edema  LYMPH: No lymphadenopathy noted  SKIN: No rashes or lesions  ENDOCRINOLOGY: No Thyromegaly  PSYCH: Appropriate    Labs:  2.9<54<4>>37<<7.355>>2.9<<3><<4><<5<<379>>                            12.8   7.17  )-----------( 145      ( 26 Aug 2024 06:15 )             38.2                         13.4   9.17  )-----------( 145      ( 25 Aug 2024 08:07 )             40.9     08-26    138  |  104  |  15  ----------------------------<  94  3.8   |  23  |  0.72  08-25    143  |  106  |  19  ----------------------------<  104<H>  4.1   |  27  |  0.89    Ca    8.1<L>      26 Aug 2024 06:15  Ca    8.2<L>      25 Aug 2024 08:07  Phos  2.1     08-26  Mg     1.50     08-26    TPro  6.3  /  Alb  3.6  /  TBili  1.3<H>  /  DBili  x   /  AST  30  /  ALT  15  /  AlkPhos  73  08-25    CAPILLARY BLOOD GLUCOSE        LIVER FUNCTIONS - ( 25 Aug 2024 08:07 )  Alb: 3.6 g/dL / Pro: 6.3 g/dL / ALK PHOS: 73 U/L / ALT: 15 U/L / AST: 30 U/L / GGT: x           PT/INR - ( 26 Aug 2024 06:15 )   PT: 23.5 sec;   INR: 2.15 ratio         PTT - ( 25 Aug 2024 08:07 )  PTT:44.0 sec  Urinalysis Basic - ( 26 Aug 2024 06:15 )    Color: x / Appearance: x / SG: x / pH: x  Gluc: 94 mg/dL / Ketone: x  / Bili: x / Urobili: x   Blood: x / Protein: x / Nitrite: x   Leuk Esterase: x / RBC: x / WBC x   Sq Epi: x / Non Sq Epi: x / Bacteria: x      Urinalysis with Rflx Culture (collected 25 Aug 2024 12:08)      D DImerLactate, Blood: 0.8 mmol/L (08-25 @ 08:07)    rad< from: Xray Chest 1 View- PORTABLE-Urgent (08.25.24 @ 08:54) >  The heart is normal in size.    Left upper lung patchy opacity. The right lung is clear. No pneumothorax.   No pleural effusion.    No acute osseous abnormalities. Degenerative changes of the spine.    IMPRESSION:  Left upper lung patchy opacity compatible with pneumonia. Recommend   follow-up to resolution.    --- End of Report ---            ALIE TSE DO; Attending Radiologist  This document has been electronically signed. Aug 25 2024 10:30AM    < end of copied text >      Studies  Chest X-RAY  CT SCAN Chest   CT Abdomen  Venous Dopplers: LE:   Others

## 2024-08-26 NOTE — PHYSICAL THERAPY INITIAL EVALUATION ADULT - PERTINENT HX OF CURRENT PROBLEM, REHAB EVAL
89 year old male PMhx HTN, HLD, AFib on coumadin, COPD, lung CA status post RT-in remission who presented with chills, and shortness of breath. Admitted for pneumonia.

## 2024-08-26 NOTE — PROGRESS NOTE ADULT - PROBLEM SELECTOR PLAN 1
- pt with fever/chills, CXR EMILIO patchy opacity c/w Pneumonia  - given vanco/zosyn in ED, blood culture sent  - started Ceftriaxone  - off zithromax since urine Legionella Ag neg.   - send sputum culture if pt can produce  - wean O2 as tolerated, on 2L nc --> room air

## 2024-08-26 NOTE — CONSULT NOTE ADULT - ASSESSMENT
88 y/o male with hx of HTN, HLD, AFib on coumadin, COPD, lung CA s/p RT-in remission, presents with 1 day hx of shaking chills (rigor), SOB with labored breathing/tachypnea that woke him up this morning, denies chest pain/gi or gu symptoms, has slight cough but no purulent sputum, no sick contact, reports recently in [facility] for a day for hematuria requiring CBI that cleared up his urine.   In ED arrival, he was on 4L oxygen via NC, satting 94%n triage, CXR demonstrated EMILIO infiltrate c/f pneumonia. Vitals temp febrile to 100.9F, concern for sepsis d/t pneumonia, blood cultures sent, he was given 3L NS fluid bolus, tylenol, duoneb x1. lab work WBc 9.1, hb 13.4, INR 2.66, Cr 0.89, TBili 1.3, VBG pH 7.35 , lactate 0.9, UA small LE, wbc 14.   Currently, pt feels better, breathing improved, on 2L nc.   (25 Aug 2024 15:19)    EMILIO pneumonia:  HTN  A fibrillation  COPD: Lung cancer    EMILIO pneumonia:  -do ct chest especially in history of previous lung cancer  -cont antibtiocs  -HIS vbg is reasonable  -check all cultures    HTN  -controlled  A fibrillation  -on coumadin   COPD: Lung cancer  -he was on breo Ellipta at home:   -here he is getting symbicort    dw acp  88 y/o male with hx of HTN, HLD, AFib on coumadin, COPD, lung CA s/p RT-in remission, presents with 1 day hx of shaking chills (rigor), SOB with labored breathing/tachypnea that woke him up this morning, denies chest pain/gi or gu symptoms, has slight cough but no purulent sputum, no sick contact, reports recently in [facility] for a day for hematuria requiring CBI that cleared up his urine.   In ED arrival, he was on 4L oxygen via NC, satting 94%n triage, CXR demonstrated EMILIO infiltrate c/f pneumonia. Vitals temp febrile to 100.9F, concern for sepsis d/t pneumonia, blood cultures sent, he was given 3L NS fluid bolus, tylenol, duoneb x1. lab work WBc 9.1, hb 13.4, INR 2.66, Cr 0.89, TBili 1.3, VBG pH 7.35 , lactate 0.9, UA small LE, wbc 14.   Currently, pt feels better, breathing improved, on 2L nc.   (25 Aug 2024 15:19)    EMILIO pneumonia:  HTN  A fibrillation  COPD: Lung cancer    EMILIO pneumonia:  -do ct chest especially in history of previous lung cancer  -cont antibtiocs  -HIS vbg is reasonable  -check all cultures    -he was on oxygen before and now off oxygen   HTN  -controlled  A fibrillation  -on coumadin   COPD: Lung cancer  -he was on breo Ellipta at home:   -here he is getting Symbicort  -he is still wheezing:  has niot used his breo yet:  start using as he stil have slight wheezing    dw acp and family

## 2024-08-26 NOTE — PROGRESS NOTE ADULT - PROBLEM SELECTOR PLAN 3
c/w warfarin 2mg/2mg/1mg takes it in the morning, last took 1mg yesterday, reluctant to take evening dose tonight for fear of overlapping with morning dose when he's back home  daily coumadin  daily PT/INR , aim INR 2-3  currently therapeutic    TTE from 7/2022 showed LVEF 56%, mod MR, mild AI  outpt f/up EP Dr. Carbajal

## 2024-08-26 NOTE — PROGRESS NOTE ADULT - PROBLEM SELECTOR PLAN 7
reports he's in remission, s/p 6 cycles of radiation at [facility] in [place], outpt f/up onc Dr. Irizarry

## 2024-08-26 NOTE — PROGRESS NOTE ADULT - ASSESSMENT
89 y/o male with hx of HTN, HLD, AFib on coumadin, COPD, lung CA s/p RT-in remission, woke up in the middle of night with sob, rigor, in ED found to have sepsis d/t pneumonia , given fluids and started on Abx

## 2024-08-26 NOTE — CONSULT NOTE ADULT - SUBJECTIVE AND OBJECTIVE BOX
[facility], Division of Infectious Diseases  ASHLYN Watt S. Shah, Y. Patel, G. Conrado  [phone]  ([phone] - weekdays after 5pm and weekends)    LEIGH ANN CARLOS  89y, Male  [id number]    HPI--  HPI:  88 y/o M PMhx HTN, HLD, AFib on coumadin, COPD, lung CA s/p RT-in remission who presented w/ chills, and SOB. Reports cough but no purulent sputum. Lives at home with his wife. Denies being around any sick contacts.  In ED was placed on 4L NC, CXR was c/f EMILIO PNA. He was febrile to 100/9 and started on zosyn and vanc x 1. Was transitioned to ceftriaxone and azithromycin thereafter.  Denies fever, chills, chest pain, SOB, abd pain, n/v, diarrhea, dysuria or frequency.    ROS: 10 point review of systems completed, pertinent positives and negatives as per HPI.    Allergies: Sulfur (Other)  no PCN allergy (Unknown)    PMH -- AF (Atrial Fibrillation)    Hypercholesterolemia    HTN (hypertension)    Enlarged prostate      PSH -- Hx of tonsillectomy      FH -- No pertinent family history in first degree relatives    FH: kidney disease (Father)      Social History -- denies illicit drug use  former smoker, quit 30 yrs ago    Physical Exam--  Vital Signs Last 24 Hrs  T(F): 97.7 (26 Aug 2024 09:00), Max: 98 (25 Aug 2024 23:28)  HR: 66 (26 Aug 2024 09:00) (66 - 96)  BP: 126/74 (26 Aug 2024 09:00) (126/74 - 155/90)  RR: 18 (26 Aug 2024 09:00) (17 - 22)  SpO2: 96% (26 Aug 2024 09:00) (93% - 99%)  General: nontoxic-appearing, no acute distress  HEENT: anicteric  Lungs: wheezing b/l  Heart: S1, S2, normal rate.  Abdomen: Soft. Nondistended. Nontender.   Neuro: AAOx3, no obvious focal deficits   Extremities: trace LE edema.   Skin: Warm. Dry.   Psychiatric: Appropriate affect and mood for situation.     Laboratory & Imaging Data--  CBC:                       12.8   7.17  )-----------( 145      ( 26 Aug 2024 06:15 )             38.2     WBC Count: 7.17 K/uL (08-26-24 @ 06:15)  WBC Count: 9.17 K/uL (08-25-24 @ 08:07)    CMP: 08-26    138  |  104  |  15  ----------------------------<  94  3.8   |  23  |  0.72    Ca    8.1<L>      26 Aug 2024 06:15  Phos  2.1     08-26  Mg     1.50     08-26    TPro  6.3  /  Alb  3.6  /  TBili  1.3<H>  /  DBili  x   /  AST  30  /  ALT  15  /  AlkPhos  73  08-25    LIVER FUNCTIONS - ( 25 Aug 2024 08:07 )  Alb: 3.6 g/dL / Pro: 6.3 g/dL / ALK PHOS: 73 U/L / ALT: 15 U/L / AST: 30 U/L / GGT: x           Urinalysis Basic - ( 26 Aug 2024 06:15 )    Color: x / Appearance: x / SG: x / pH: x  Gluc: 94 mg/dL / Ketone: x  / Bili: x / Urobili: x   Blood: x / Protein: x / Nitrite: x   Leuk Esterase: x / RBC: x / WBC x   Sq Epi: x / Non Sq Epi: x / Bacteria: x      Microbiology:     Urinalysis with Rflx Culture (collected 08-25-24 @ 12:08)        Radiology--  ***  Active Medications--  acetaminophen     Tablet .. 650 milliGRAM(s) Oral every 6 hours PRN  albuterol/ipratropium for Nebulization 3 milliLiter(s) Nebulizer every 6 hours  ascorbic acid 500 milliGRAM(s) Oral daily  atorvastatin 20 milliGRAM(s) Oral at bedtime  azithromycin  IVPB      azithromycin  IVPB 500 milliGRAM(s) IV Intermittent every 24 hours  budesonide 160 MICROgram(s)/formoterol 4.5 MICROgram(s) Inhaler 2 Puff(s) Inhalation two times a day  budesonide 160 MICROgram(s)/formoterol 4.5 MICROgram(s) Inhaler 2 Puff(s) Inhalation two times a day  cefTRIAXone   IVPB 1000 milliGRAM(s) IV Intermittent every 24 hours  finasteride 5 milliGRAM(s) Oral daily  metoprolol succinate ER 50 milliGRAM(s) Oral two times a day  tamsulosin 0.8 milliGRAM(s) Oral at bedtime    Antimicrobials:   azithromycin  IVPB      azithromycin  IVPB 500 milliGRAM(s) IV Intermittent every 24 hours  cefTRIAXone   IVPB 1000 milliGRAM(s) IV Intermittent every 24 hours    Immunologic:

## 2024-08-26 NOTE — PHYSICAL THERAPY INITIAL EVALUATION ADULT - GENERAL OBSERVATIONS, REHAB EVAL
Patient received semi-supine in NAD, agreeable to participate in limited PT Evaluation. 96% oxygen saturation on room air. Wife and daughters at bedside.

## 2024-08-26 NOTE — PROGRESS NOTE ADULT - NSPROGADDITIONALINFOA_GEN_ALL_CORE
d/w ID and pulm.  d/w the pt, wife and the two daughters at bedside.     - Dr. TAMMY Kellyet ([facility])  - [phone]

## 2024-08-26 NOTE — PHYSICAL THERAPY INITIAL EVALUATION ADULT - ADDITIONAL COMMENTS
Patient lives with his wife in a house, main floor bedroom, +13 steps downstairs to basement workspace. Patient is independent with ADLs and ambulation. Reports 1 fall in January; tripped on cement.     Patient left semi-supine in NAD, +call bell.

## 2024-08-26 NOTE — PHYSICAL THERAPY INITIAL EVALUATION ADULT - LEVEL OF INDEPENDENCE: SUPINE/SIT, REHAB EVAL
Patient deferred stating he would like to rest before his CT scan. Patient agreeable to attempting another time if he has the energy. ROMMEL Mustafa made aware.

## 2024-08-27 ENCOUNTER — TRANSCRIPTION ENCOUNTER (OUTPATIENT)
Age: 89
End: 2024-08-27

## 2024-08-27 VITALS
HEART RATE: 80 BPM | OXYGEN SATURATION: 97 % | RESPIRATION RATE: 18 BRPM | TEMPERATURE: 98 F | SYSTOLIC BLOOD PRESSURE: 127 MMHG | DIASTOLIC BLOOD PRESSURE: 79 MMHG

## 2024-08-27 LAB
ANION GAP SERPL CALC-SCNC: 11 MMOL/L — SIGNIFICANT CHANGE UP (ref 7–14)
BASOPHILS # BLD AUTO: 0.05 K/UL — SIGNIFICANT CHANGE UP (ref 0–0.2)
BASOPHILS NFR BLD AUTO: 0.6 % — SIGNIFICANT CHANGE UP (ref 0–2)
BUN SERPL-MCNC: 13 MG/DL — SIGNIFICANT CHANGE UP (ref 7–23)
CALCIUM SERPL-MCNC: 8.6 MG/DL — SIGNIFICANT CHANGE UP (ref 8.4–10.5)
CHLORIDE SERPL-SCNC: 104 MMOL/L — SIGNIFICANT CHANGE UP (ref 98–107)
CO2 SERPL-SCNC: 24 MMOL/L — SIGNIFICANT CHANGE UP (ref 22–31)
CREAT SERPL-MCNC: 0.81 MG/DL — SIGNIFICANT CHANGE UP (ref 0.5–1.3)
EGFR: 84 ML/MIN/1.73M2 — SIGNIFICANT CHANGE UP
EOSINOPHIL # BLD AUTO: 0.29 K/UL — SIGNIFICANT CHANGE UP (ref 0–0.5)
EOSINOPHIL NFR BLD AUTO: 3.6 % — SIGNIFICANT CHANGE UP (ref 0–6)
GLUCOSE SERPL-MCNC: 98 MG/DL — SIGNIFICANT CHANGE UP (ref 70–99)
HCT VFR BLD CALC: 42 % — SIGNIFICANT CHANGE UP (ref 39–50)
HGB BLD-MCNC: 14 G/DL — SIGNIFICANT CHANGE UP (ref 13–17)
IANC: 4.57 K/UL — SIGNIFICANT CHANGE UP (ref 1.8–7.4)
IMM GRANULOCYTES NFR BLD AUTO: 0.4 % — SIGNIFICANT CHANGE UP (ref 0–0.9)
INR BLD: 1.8 RATIO — HIGH (ref 0.85–1.18)
LYMPHOCYTES # BLD AUTO: 1.87 K/UL — SIGNIFICANT CHANGE UP (ref 1–3.3)
LYMPHOCYTES # BLD AUTO: 23.1 % — SIGNIFICANT CHANGE UP (ref 13–44)
MAGNESIUM SERPL-MCNC: 1.8 MG/DL — SIGNIFICANT CHANGE UP (ref 1.6–2.6)
MCHC RBC-ENTMCNC: 30.8 PG — SIGNIFICANT CHANGE UP (ref 27–34)
MCHC RBC-ENTMCNC: 33.3 GM/DL — SIGNIFICANT CHANGE UP (ref 32–36)
MCV RBC AUTO: 92.5 FL — SIGNIFICANT CHANGE UP (ref 80–100)
MONOCYTES # BLD AUTO: 1.27 K/UL — HIGH (ref 0–0.9)
MONOCYTES NFR BLD AUTO: 15.7 % — HIGH (ref 2–14)
NEUTROPHILS # BLD AUTO: 4.57 K/UL — SIGNIFICANT CHANGE UP (ref 1.8–7.4)
NEUTROPHILS NFR BLD AUTO: 56.6 % — SIGNIFICANT CHANGE UP (ref 43–77)
NRBC # BLD: 0 /100 WBCS — SIGNIFICANT CHANGE UP (ref 0–0)
NRBC # FLD: 0 K/UL — SIGNIFICANT CHANGE UP (ref 0–0)
PHOSPHATE SERPL-MCNC: 2.5 MG/DL — SIGNIFICANT CHANGE UP (ref 2.5–4.5)
PLATELET # BLD AUTO: 170 K/UL — SIGNIFICANT CHANGE UP (ref 150–400)
POTASSIUM SERPL-MCNC: 3.9 MMOL/L — SIGNIFICANT CHANGE UP (ref 3.5–5.3)
POTASSIUM SERPL-SCNC: 3.9 MMOL/L — SIGNIFICANT CHANGE UP (ref 3.5–5.3)
PROTHROM AB SERPL-ACNC: 20 SEC — HIGH (ref 9.5–13)
RBC # BLD: 4.54 M/UL — SIGNIFICANT CHANGE UP (ref 4.2–5.8)
RBC # FLD: 13.5 % — SIGNIFICANT CHANGE UP (ref 10.3–14.5)
SODIUM SERPL-SCNC: 139 MMOL/L — SIGNIFICANT CHANGE UP (ref 135–145)
WBC # BLD: 8.08 K/UL — SIGNIFICANT CHANGE UP (ref 3.8–10.5)
WBC # FLD AUTO: 8.08 K/UL — SIGNIFICANT CHANGE UP (ref 3.8–10.5)

## 2024-08-27 RX ORDER — WARFARIN SODIUM 2 MG
2 TABLET ORAL ONCE
Refills: 0 | Status: DISCONTINUED | OUTPATIENT
Start: 2024-08-27 | End: 2024-08-27

## 2024-08-27 RX ORDER — CEFUROXIME SODIUM 1.5 G
1 VIAL (EA) INJECTION
Qty: 4 | Refills: 0
Start: 2024-08-27 | End: 2024-08-28

## 2024-08-27 RX ADMIN — IPRATROPIUM BROMIDE AND ALBUTEROL SULFATE 3 MILLILITER(S): .5; 3 SOLUTION RESPIRATORY (INHALATION) at 15:12

## 2024-08-27 RX ADMIN — FLUTICASONE FUROATE AND VILANTEROL TRIFENATATE 1 PUFF(S): 200; 25 POWDER RESPIRATORY (INHALATION) at 10:06

## 2024-08-27 RX ADMIN — FINASTERIDE 5 MILLIGRAM(S): 1 TABLET, FILM COATED ORAL at 11:46

## 2024-08-27 RX ADMIN — Medication 500 MILLIGRAM(S): at 11:46

## 2024-08-27 RX ADMIN — Medication 100 MILLIGRAM(S): at 15:31

## 2024-08-27 RX ADMIN — METOPROLOL TARTRATE 50 MILLIGRAM(S): 100 TABLET ORAL at 05:07

## 2024-08-27 RX ADMIN — IPRATROPIUM BROMIDE AND ALBUTEROL SULFATE 3 MILLILITER(S): .5; 3 SOLUTION RESPIRATORY (INHALATION) at 09:12

## 2024-08-27 NOTE — DISCHARGE NOTE PROVIDER - HOSPITAL COURSE
Assessment	  89 y/o male with hx of HTN, HLD, AFib on coumadin, COPD, lung CA s/p RT-in remission, woke up in the middle of night with sob, rigor, in ED found to have sepsis d/t pneumonia , given fluids and started on Abx    PLAN  # Sepsis due to pneumonia.   : - pt with fever/chills, CXR EMILIO patchy opacity c/w Pneumonia  - given vanco/zosyn in ED, blood culture sent  - s/p Ceftriaxone  - off zithromax since urine Legionella Ag neg.   - send sputum culture if pt can produce  - wean O2 as tolerated, on 2L nc --> room air.  - ID following - ok to discharge patient on Cefuroxime 500mg BID until 8/29    # COPD (chronic obstructive pulmonary disease).   cont breo or equivalent bronchodilator  outpt f/up with pt's Pulmonologist Dr. Arciniega.  - Pt also scheduled for virtual pulmonary appt on 8/28 @ 11:00AM with Ceci Hazel NP    # Chronic atrial fibrillation.   : c/w warfarin 2mg/2mg/1mg takes it in the morning, last took 1mg yesterday, reluctant to take evening dose tonight for fear of overlapping with morning dose when he's back home  daily coumadin  daily PT/INR , aim INR 2-3  currently therapeutic    TTE from 7/2022 showed LVEF 56%, mod MR, mild AI  outpt f/up EP Dr. Carbajal.    # HTN (hypertension).   · hemodynamically stable, ctm  given 3L NS fluid bolus in ED.    # HLD (hyperlipidemia).   - c/w statin.    #  BPH (benign prostatic hyperplasia).   ·  Plan: c/w proscar and uroxatral.    # History of lung cancer.   - reports he's in remission, s/p 6 cycles of radiation at [facility] in [place], outpt f/up onc Dr. Irizarry.    Case discussed with Medicine Attg, Dr. Klein, medically cleared to be discharged home today.     Assessment	  88 y/o male with hx of HTN, HLD, AFib on coumadin, COPD, lung CA s/p RT-in remission, woke up in the middle of night with sob, rigor, in ED found to have sepsis d/t pneumonia , given fluids and started on Abx    PLAN  # Sepsis due to pneumonia.   : - pt with fever/chills, CXR EMILIO patchy opacity c/w Pneumonia  - given vanco/zosyn in ED, blood culture sent  - s/p Ceftriaxone  - off zithromax since urine Legionella Ag neg.   - send sputum culture if pt can produce  - wean O2 as tolerated, on 2L nc --> room air.  - ID following - ok to discharge patient on Cefuroxime 500mg BID until 8/29    # COPD (chronic obstructive pulmonary disease).   cont breo or equivalent bronchodilator  outpt f/up with pt's Pulmonologist Dr. Arciniega.  - Pt also scheduled for virtual pulmonary appt on 8/28 @ 11:00AM with Ceci Hazel NP    # Chronic atrial fibrillation.   : c/w warfarin 2mg/2mg/1mg takes it in the morning, last took 1mg yesterday, reluctant to take evening dose tonight for fear of overlapping with morning dose when he's back home  daily coumadin  daily PT/INR , aim INR 2-3  currently therapeutic    TTE from 7/2022 showed LVEF 56%, mod MR, mild AI  outpt f/up EP Dr. Carbajal.    # HTN (hypertension).   · hemodynamically stable, ctm  given 3L NS fluid bolus in ED.    # HLD (hyperlipidemia).   - c/w statin.    #  BPH (benign prostatic hyperplasia).   ·  Plan: c/w proscar and uroxatral.    # History of lung cancer.   - reports he's in remission, s/p 6 cycles of radiation at [facility] in [place], outpt f/up onc Dr. Irizarry.    Case discussed with Medicine Attg, Dr. Klein, medically cleared to be discharged home today.    Attending Addendum:  Patient seen and examined by me on the discharge day. Medications reviewed.   Feeling much better. No cp, no sob. no abd pain. no n/v/d. no HA, no Dizziness.  Met with pt's wife and the two daughters at bedside. CT scan findings of possible EMILIO mass/opacity and right adrenal cyst reviewed.  outpt repeat imagning in 2-3 months, outpt follow up with Dr. Demian granger and [facility] onc for serial monitoring.    All questions answered in details. Follow up plan explained. d/w NP/PA.  More than 30 mins were spent evaluating patient and coordinating care for discharge.  Discharge summary sent to pt's primary care physician at [facility].

## 2024-08-27 NOTE — DISCHARGE NOTE NURSING/CASE MANAGEMENT/SOCIAL WORK - PATIENT PORTAL LINK FT
You can access the FollowMyHealth Patient Portal offered by [facility] by registering at the following website: [email or web address]. By joining [facility]’s FollowMyHealth portal, you will also be able to view your health information using other applications (apps) compatible with our system.

## 2024-08-27 NOTE — DISCHARGE NOTE PROVIDER - NSDCFUADDAPPT_GEN_ALL_CORE_FT
Telehealth Instructions:  At the time of your appointment, you will receive a text/email invite for your telehealth session. Please click on the link, enter the patient's name. You will then be redirected to a virtual waiting room, where you will wait until the Doctor has connected with you.     Pulmonary Virtual Appt Date/Time  You have been scheduled on 8/28 at 11:00AM with Ceci Hazel NP    Please follow up with PCP Dr. Burks on 8/29 at 10:45AM

## 2024-08-27 NOTE — DISCHARGE NOTE PROVIDER - PROVIDER TOKENS
FREE:[LAST:[Sylvia Gupta],PHONE:[phone]],FAX:[(   )    -],ADDRESS:[PRIMARY CARE OFFICE],SCHEDULEDAPPT:[08/29/2024],SCHEDULEDAPPTTIME:[10:45 AM]],FREE:[LAST:[Ceci Hazel NP],PHONE:[(   )    -],FAX:[(   )    -],ADDRESS:[Virtual Pulmonary Appt],SCHEDULEDAPPT:[08/28/2024],SCHEDULEDAPPTTIME:[11:00 AM]]

## 2024-08-27 NOTE — PROGRESS NOTE ADULT - ASSESSMENT
88 y/o M PMhx HTN, HLD, AFib on coumadin, COPD, lung CA s/p RT-in remission who presented w/ chills, and SOB     PNA, fever  acute hypoxic resp failure- resolved  febrile to 100.9 on admission  COVID/ flu/ RSV negative  CXR- L upper lung patchy opacity compatible with pneumonia  CT chest- Posterior left upper lobe 5.3 x 3.1 cm masslike consolidative opacity. Diagnostic considerations include pneumonia, radiation treatment, and lung neoplasm/recurrence.  blood cx- NGTD    Recommendations  c/w ceftriaxone 1g daily  on discharge can transition to cefuroxime 500mg bid w/ last day 8/29  urine legionella Ag negative, discontinue azithromycin  pulm f/u    José Miguel Camp M.D.  [facility], Division of Infectious Diseases  [phone]  After 5pm on weekdays and all day on weekends - please call [phone]  88 y/o M PMhx HTN, HLD, AFib on coumadin, COPD, lung CA s/p RT-in remission who presented w/ chills, and SOB     PNA, fever  acute hypoxic resp failure- resolved  febrile to 100.9 on admission  COVID/ flu/ RSV negative  CXR- L upper lung patchy opacity compatible with pneumonia  CT chest- Posterior left upper lobe 5.3 x 3.1 cm masslike consolidative opacity. Diagnostic considerations include pneumonia, radiation treatment, and lung neoplasm/recurrence.  blood cx- NGTD    outpatient chart reviewed  per notes patient completed radiation 9/2022  CT chest report 12/2022 demonstrated diffuse reticular, GGO w/ consolidation EMILIO which were expected changes from radiation treatment    Recommendations  c/w ceftriaxone 1g daily  on discharge can transition to cefuroxime 500mg bid w/ last day 8/29  urine legionella Ag negative, discontinue azithromycin  pulm f/u    José Miguel Camp M.D.  [facility], Division of Infectious Diseases  [phone]  After 5pm on weekdays and all day on weekends - please call [phone]

## 2024-08-27 NOTE — DISCHARGE NOTE PROVIDER - NSDCCPCAREPLAN_GEN_ALL_CORE_FT
PRINCIPAL DISCHARGE DIAGNOSIS  Diagnosis: Sepsis, unspecified organism  Assessment and Plan of Treatment: You were admitted at [facility] in setting of Pneumonia. You were seen and evaluated by the Infectious Disease Doctor. You were started on Intravenous Antibiotics, and now has been transitioned to oral antibiotics until 8/29.  - Please complete antibiotic course as prescribed on your discharge paper  - Please Follow up with Virtual Pulmonary appointment on 8/28 at 11:00AM with Ceci Hazel NP   - Please call and follow up with your outpatient pulmonologist as well within 2 weeks upon discharge  - Please follow up with your PCP on Thursday 8/29 at 10:45AM      SECONDARY DISCHARGE DIAGNOSES  Diagnosis: Chronic atrial fibrillation  Assessment and Plan of Treatment: You have history of Chronic atrial fibrillation.   - Please continue with Warfarin 2mg/2mg/1mg takes it in the morning,   - Please follow up with EP Dr. Carbajal.    Diagnosis: HLD (hyperlipidemia)  Assessment and Plan of Treatment: You have history of hyperlipidemia   - Please continue with Statin.    Diagnosis: HTN (hypertension)  Assessment and Plan of Treatment: You have history of hypertension   · Please continue with low salt diet and medications as indicated on your discharge paper.  - Please follow up with your PCP    Diagnosis: History of lung cancer  Assessment and Plan of Treatment: You have history of History of lung cancer.   - reports he's in remission, s/p 6 cycles of radiation at [facility] in , outpt f/up onc Dr. Irizarry.    Diagnosis: COPD (chronic obstructive pulmonary disease)  Assessment and Plan of Treatment: COPD (chronic obstructive pulmonary disease).   cont breo or equivalent bronchodilator  outpt f/up with pt's Pulmonologist Dr. Arciniega.  - Pt also scheduled for virtual pulmonary appt on 8/28 @ 11:00AM with Ceci Hazel NP

## 2024-08-27 NOTE — DISCHARGE NOTE NURSING/CASE MANAGEMENT/SOCIAL WORK - NSSCNAMETXT_GEN_ALL_CORE
What Is The Reason For Today's Visit?: Full Body Skin Examination What Is The Reason For Today's Visit? (Being Monitored For X): concerning skin lesions on an annual basis How Severe Are Your Spot(S)?: moderate You were referred to [facility] for home care services. The visiting RN will call your daughter Harleen to schedule a visit.

## 2024-08-27 NOTE — DISCHARGE NOTE PROVIDER - CARE PROVIDER_API CALL
Sylvia Gupta,   PRIMARY CARE OFFICE  Phone: [phone]  Fax: (   )    -  Scheduled Appointment: 08/29/2024 10:45 AM    Ceci Hazel NP,   Virtual Pulmonary Appt  Phone: (   )    -  Fax: (   )    -  Scheduled Appointment: 08/28/2024 11:00 AM

## 2024-08-27 NOTE — DISCHARGE NOTE PROVIDER - NSDCMRMEDTOKEN_GEN_ALL_CORE_FT
Breo Ellipta 200 mcg-25 mcg/inh inhalation powder: 1 puff(s) inhaled once a day  cefuroxime 500 mg oral tablet: 1 tab(s) orally 2 times a day  erythromycin 0.5% ophthalmic ointment: 1 gram(s) in the left eye once a day (at bedtime)  finasteride 5 mg oral tablet: 1 tab(s) orally once a day  Hiprex 1 g oral tablet: 1 tab(s) orally once a day ...(takes with Vitamin C 500mg)  metoprolol succinate 50 mg oral tablet, extended release: 1 tab(s) orally 2 times a day  simvastatin 40 mg oral tablet: 1 tab(s) orally once a day (at bedtime)  Uroxatral 10 mg oral tablet, extended release: 1 tab(s) orally once a day (at bedtime)  Vitamin C 500 mg oral tablet, chewable: 1 tab(s) chewed once a day ...(takes with Hiprex 1 g)  warfarin 2 mg oral tablet: 2mg on day 1, 2mg on day 2, 1mg on day 3, then repeat   Breo Ellipta 200 mcg-25 mcg/inh inhalation powder: 1 puff(s) inhaled once a day  cefuroxime 500 mg oral tablet: 1 tab(s) orally 2 times a day  erythromycin 0.5% ophthalmic ointment: 1 gram(s) in the left eye once a day (at bedtime)  finasteride 5 mg oral tablet: 1 tab(s) orally once a day  Hiprex 1 g oral tablet: 1 tab(s) orally once a day ...(takes with Vitamin C 500mg)  metoprolol succinate 50 mg oral tablet, extended release: 1 tab(s) orally 2 times a day  Outpatient Physical Therapy: Please assess and provide services for Outpatient PT 3x weeks for 90 days.  simvastatin 40 mg oral tablet: 1 tab(s) orally once a day (at bedtime)  Uroxatral 10 mg oral tablet, extended release: 1 tab(s) orally once a day (at bedtime)  Vitamin C 500 mg oral tablet, chewable: 1 tab(s) chewed once a day ...(takes with Hiprex 1 g)  warfarin 2 mg oral tablet: 2mg on day 1, 2mg on day 2, 1mg on day 3, then repeat

## 2024-08-27 NOTE — PROGRESS NOTE ADULT - SUBJECTIVE AND OBJECTIVE BOX
Date of Service: 08-27-24 @ 09:59    Patient is a 89y old  Male who presents with a chief complaint of pneumonia (27 Aug 2024 09:06)      Any change in ROS: seems O K;  no wheezing to me today  ;  remains on room air     MEDICATIONS  (STANDING):  albuterol/ipratropium for Nebulization 3 milliLiter(s) Nebulizer every 6 hours  ascorbic acid 500 milliGRAM(s) Oral daily  atorvastatin 20 milliGRAM(s) Oral at bedtime  cefTRIAXone   IVPB 1000 milliGRAM(s) IV Intermittent every 24 hours  finasteride 5 milliGRAM(s) Oral daily  fluticasone furoate/vilanterol 200-25 MICROgram(s) Inhaler 1 Puff(s) Inhalation daily  metoprolol succinate ER 50 milliGRAM(s) Oral two times a day  tamsulosin 0.8 milliGRAM(s) Oral at bedtime    MEDICATIONS  (PRN):  acetaminophen     Tablet .. 650 milliGRAM(s) Oral every 6 hours PRN Temp greater or equal to 38C (100.4F), Mild Pain (1 - 3)    Vital Signs Last 24 Hrs  T(C): 36.6 (27 Aug 2024 09:56), Max: 37.4 (26 Aug 2024 14:48)  T(F): 97.9 (27 Aug 2024 09:56), Max: 99.4 (26 Aug 2024 14:48)  HR: 84 (27 Aug 2024 09:56) (74 - 90)  BP: 107/75 (27 Aug 2024 09:56) (107/75 - 169/95)  BP(mean): --  RR: 17 (27 Aug 2024 09:56) (17 - 19)  SpO2: 96% (27 Aug 2024 09:56) (95% - 99%)    Parameters below as of 27 Aug 2024 09:56  Patient On (Oxygen Delivery Method): room air        I&O's Summary    26 Aug 2024 07:01  -  27 Aug 2024 07:00  --------------------------------------------------------  IN: 1300 mL / OUT: 0 mL / NET: 1300 mL          Physical Exam:   GENERAL: NAD, well-groomed, well-developed  HEENT: JONATHAN/   Atraumatic, Normocephalic  ENMT: No tonsillar erythema, exudates, or enlargement; Moist mucous membranes, Good dentition, No lesions  NECK: Supple, No JVD, Normal thyroid  CHEST/LUNG: Clear to auscultaion- no wheezing to me   CVS: Regular rate and rhythm; No murmurs, rubs, or gallops  GI: : Soft, Nontender, Nondistended; Bowel sounds present  NERVOUS SYSTEM:  Alert & Oriented X3  EXTREMITIES:  2+ Peripheral Pulses, No clubbing, cyanosis, or edema  LYMPH: No lymphadenopathy noted  SKIN: No rashes or lesions  ENDOCRINOLOGY: No Thyromegaly  PSYCH: Appropriate    Labs:  30                            14.0   8.08  )-----------( 170      ( 27 Aug 2024 05:22 )             42.0                         12.8   7.17  )-----------( 145      ( 26 Aug 2024 06:15 )             38.2                         13.4   9.17  )-----------( 145      ( 25 Aug 2024 08:07 )             40.9     08-27    139  |  104  |  13  ----------------------------<  98  3.9   |  24  |  0.81  08-26    138  |  104  |  15  ----------------------------<  94  3.8   |  23  |  0.72  08-25    143  |  106  |  19  ----------------------------<  104<H>  4.1   |  27  |  0.89    Ca    8.6      27 Aug 2024 05:22  Ca    8.1<L>      26 Aug 2024 06:15  Phos  2.5     08-27  Phos  2.1     08-26  Mg     1.80     08-27  Mg     1.50     08-26    TPro  6.3  /  Alb  3.6  /  TBili  1.3<H>  /  DBili  x   /  AST  30  /  ALT  15  /  AlkPhos  73  08-25    CAPILLARY BLOOD GLUCOSE            PT/INR - ( 27 Aug 2024 05:22 )   PT: 20.0 sec;   INR: 1.80 ratio           Urinalysis Basic - ( 27 Aug 2024 05:22 )    Color: x / Appearance: x / SG: x / pH: x  Gluc: 98 mg/dL / Ketone: x  / Bili: x / Urobili: x   Blood: x / Protein: x / Nitrite: x   Leuk Esterase: x / RBC: x / WBC x   Sq Epi: x / Non Sq Epi: x / Bacteria: x      Lactate, Blood: 0.8 mmol/L (08-25 @ 08:07)        RECENT CULTURES:  08-25 @ 12:08 Clean Catch                No growth    08-25 @ 08:05 .Blood Blood-Peripheral         rad< from: CT Chest No Cont (08.26.24 @ 18:56) >  BONES: Severe compression deformity of T6 vertebral body.      IMPRESSION:.    Posterior left upper lobe 5.3 x 3.1 cm masslike consolidative opacity.   Diagnostic considerations include pneumonia, radiation treatment, and   lung neoplasm/recurrence.    Right lower lobe 0.4 cm solid nodule; indeterminate.    Right adrenal 4.5 x 2.5 cm bilobed cystic lesion; indeterminate.    Recommend correlation with prior CT chest exams to assess stability if   any are available. If there are no priors available, then recommend CT   chest follow-up in 3 months.    --- End of Report ---          SAM HAWKINS MD; Resident Radiologist  This document has been electronically signed.  DARRON ASHBY MD; Attending Radiologist  This document has been electronically signed. Aug 27 2024  6:29AM    < end of copied text >         No growth at 24 hours    08-25 @ 07:57 .Blood Blood-Peripheral                No growth at 24 hours          RESPIRATORY CULTURES:          Studies  Chest X-RAY  CT SCAN Chest   Venous Dopplers: LE:   CT Abdomen  Others              
[facility], Division of Infectious Diseases  [facility] [address], [place]. [place], [place]. [place]  [phone]  ([phone] - weekdays after 5pm and weekends)    Name: LEIGH ANN CARLOS  Age/Gender: 89y Male  MRN: [id number]    Interval History:  Notes reviewed.   No concerning overnight events.  Afebrile.   feels good, he is eager to go home soon  family at bedside    Allergies: Sulfur (Other)  no PCN allergy (Unknown)      Objective:  Vitals:   T(F): 97.9 (08-27-24 @ 05:05), Max: 99.4 (08-26-24 @ 14:48)  HR: 90 (08-27-24 @ 05:05) (74 - 90)  BP: 162/91 (08-27-24 @ 05:05) (118/74 - 169/95)  RR: 19 (08-27-24 @ 05:05) (17 - 19)  SpO2: 95% (08-27-24 @ 05:05) (95% - 99%)  Physical Examination:  General: no acute distress  HEENT: anicteric  Cardio: S1, S2, normal rate  Resp: mild wheezing b/l  Abd: soft, NT, ND  Ext: no LE edema  Skin: warm, dry    Laboratory Studies:  CBC:                       14.0   8.08  )-----------( 170      ( 27 Aug 2024 05:22 )             42.0     WBC Trend:  8.08 08-27-24 @ 05:22  7.17 08-26-24 @ 06:15  9.17 08-25-24 @ 08:07    CMP: 08-27    139  |  104  |  13  ----------------------------<  98  3.9   |  24  |  0.81    Ca    8.6      27 Aug 2024 05:22  Phos  2.5     08-27  Mg     1.80     08-27            Urinalysis Basic - ( 27 Aug 2024 05:22 )    Color: x / Appearance: x / SG: x / pH: x  Gluc: 98 mg/dL / Ketone: x  / Bili: x / Urobili: x   Blood: x / Protein: x / Nitrite: x   Leuk Esterase: x / RBC: x / WBC x   Sq Epi: x / Non Sq Epi: x / Bacteria: x      Microbiology: reviewed     Urinalysis with Rflx Culture (collected 08-25-24 @ 12:08)    Culture - Urine (collected 08-25-24 @ 12:08)  Source: Clean Catch  Final Report (08-26-24 @ 12:46):    No growth    Culture - Blood (collected 08-25-24 @ 08:05)  Source: .Blood Blood-Peripheral  Preliminary Report (08-26-24 @ 15:02):    No growth at 24 hours    Culture - Blood (collected 08-25-24 @ 07:57)  Source: .Blood Blood-Peripheral  Preliminary Report (08-26-24 @ 15:02):    No growth at 24 hours        Radiology: reviewed     Medications:  acetaminophen     Tablet .. 650 milliGRAM(s) Oral every 6 hours PRN  albuterol/ipratropium for Nebulization 3 milliLiter(s) Nebulizer every 6 hours  ascorbic acid 500 milliGRAM(s) Oral daily  atorvastatin 20 milliGRAM(s) Oral at bedtime  cefTRIAXone   IVPB 1000 milliGRAM(s) IV Intermittent every 24 hours  finasteride 5 milliGRAM(s) Oral daily  fluticasone furoate/vilanterol 200-25 MICROgram(s) Inhaler 1 Puff(s) Inhalation daily  metoprolol succinate ER 50 milliGRAM(s) Oral two times a day  tamsulosin 0.8 milliGRAM(s) Oral at bedtime    Antimicrobials:  cefTRIAXone   IVPB 1000 milliGRAM(s) IV Intermittent every 24 hours  
SUBJECTIVE/ OVERNIGHT EVENTS:  feeling better  +wheezing  no cp, no sob, no n/v/d. no abdominal pain.  no headache, no dizziness.   stable on room air  the wife and the two daughter at bedside.       --------------------------------------------------------------------------------------------  LABS:                        12.8   7.17  )-----------( 145      ( 26 Aug 2024 06:15 )             38.2     08-26    138  |  104  |  15  ----------------------------<  94  3.8   |  23  |  0.72    Ca    8.1<L>      26 Aug 2024 06:15  Phos  2.1     08-26  Mg     1.50     08-26    TPro  6.3  /  Alb  3.6  /  TBili  1.3<H>  /  DBili  x   /  AST  30  /  ALT  15  /  AlkPhos  73  08-25    PT/INR - ( 26 Aug 2024 06:15 )   PT: 23.5 sec;   INR: 2.15 ratio         PTT - ( 25 Aug 2024 08:07 )  PTT:44.0 sec  CAPILLARY BLOOD GLUCOSE            Urinalysis Basic - ( 26 Aug 2024 06:15 )    Color: x / Appearance: x / SG: x / pH: x  Gluc: 94 mg/dL / Ketone: x  / Bili: x / Urobili: x   Blood: x / Protein: x / Nitrite: x   Leuk Esterase: x / RBC: x / WBC x   Sq Epi: x / Non Sq Epi: x / Bacteria: x        RADIOLOGY & ADDITIONAL TESTS:    Imaging Personally Reviewed:  [x] YES  [ ] NO    Consultant(s) Notes Reviewed:  [x] YES  [ ] NO    MEDICATIONS  (STANDING):  albuterol/ipratropium for Nebulization 3 milliLiter(s) Nebulizer every 6 hours  ascorbic acid 500 milliGRAM(s) Oral daily  atorvastatin 20 milliGRAM(s) Oral at bedtime  cefTRIAXone   IVPB 1000 milliGRAM(s) IV Intermittent every 24 hours  finasteride 5 milliGRAM(s) Oral daily  fluticasone furoate/vilanterol 200-25 MICROgram(s) Inhaler 1 Puff(s) Inhalation daily  metoprolol succinate ER 50 milliGRAM(s) Oral two times a day  tamsulosin 0.8 milliGRAM(s) Oral at bedtime    MEDICATIONS  (PRN):  acetaminophen     Tablet .. 650 milliGRAM(s) Oral every 6 hours PRN Temp greater or equal to 38C (100.4F), Mild Pain (1 - 3)      Care Discussed with Consultants/Other Providers [x] YES  [ ] NO    Vital Signs Last 24 Hrs  T(C): 37.4 (26 Aug 2024 14:48), Max: 37.4 (26 Aug 2024 14:48)  T(F): 99.4 (26 Aug 2024 14:48), Max: 99.4 (26 Aug 2024 14:48)  HR: 74 (26 Aug 2024 14:48) (66 - 96)  BP: 118/74 (26 Aug 2024 14:48) (118/74 - 155/90)  BP(mean): --  RR: 18 (26 Aug 2024 14:48) (17 - 20)  SpO2: 99% (26 Aug 2024 14:48) (93% - 99%)    Parameters below as of 26 Aug 2024 14:48  Patient On (Oxygen Delivery Method): room air      I&O's Summary    26 Aug 2024 07:01  -  26 Aug 2024 14:57  --------------------------------------------------------  IN: 340 mL / OUT: 0 mL / NET: 340 mL      PHYSICAL EXAM:  GENERAL: NAD, well-developed, comfortable  HEAD:  Atraumatic, Normocephalic  EYES: EOMI, PERRLA, conjunctiva and sclera clear  NECK: Supple, No JVD  CHEST/LUNG: mild decrease breath sounds bilaterally; + wheeze   HEART: Irregular rate and rhythm; No murmurs, rubs, or gallops  ABDOMEN: Soft, Nontender, Nondistended; Bowel sounds present  Neuro: AAOx3, no focal weakness, 5/5 b/l extremity strength  EXTREMITIES:  2+ Peripheral Pulses, No clubbing, cyanosis, or edema  SKIN: No rashes or lesions

## 2024-08-27 NOTE — PROGRESS NOTE ADULT - ASSESSMENT
88 y/o male with hx of HTN, HLD, AFib on coumadin, COPD, lung CA s/p RT-in remission, presents with 1 day hx of shaking chills (rigor), SOB with labored breathing/tachypnea that woke him up this morning, denies chest pain/gi or gu symptoms, has slight cough but no purulent sputum, no sick contact, reports recently in [facility] for a day for hematuria requiring CBI that cleared up his urine.   In ED arrival, he was on 4L oxygen via NC, satting 94%n triage, CXR demonstrated EMILIO infiltrate c/f pneumonia. Vitals temp febrile to 100.9F, concern for sepsis d/t pneumonia, blood cultures sent, he was given 3L NS fluid bolus, tylenol, duoneb x1. lab work WBc 9.1, hb 13.4, INR 2.66, Cr 0.89, TBili 1.3, VBG pH 7.35 , lactate 0.9, UA small LE, wbc 14.   Currently, pt feels better, breathing improved, on 2L nc.   (25 Aug 2024 15:19)    EMILIO pneumonia:  HTN  A fibrillation  COPD: Lung cancer    EMILIO pneumonia:  -do ct chest especially in history of previous lung cancer  -cont antibtiocs  -HIS vbg is reasonable  -check all cultures    -he was on oxygen before and now off oxygen     8/27; july 2022:  3.1x 2.1x 2.3 cm IN JULY OF 2022:   -DEC 22: NO SIZE GIVEN ;  -HE WAS TREATED WITH RADIATION THERAPY; COMPLETED SEPT 2022   -WOULD NEED T CONTACT HIS PULM SPECIALIST    -cont antibiotics for now;   -given increase in size?  post radaition, vs recurrence:  will need to discuss with his pulmonologist     HTN  -controlled    A fibrillation  -on coumadin   -8/27/L CONT AC:     COPD: Lung cancer  -he was on breo Ellipta at home:   -here he is getting Symbicort  -he is still wheezing:  has not used his breo yet:  start using as he stilL have slight wheezing    8/27:   SEEMS TO BE DOING  OK ; NO SOB:    -He has no wheezing today  ; no need for iv steroids ; cont b reo as wellas BD     dw acp and family

## 2024-08-29 ENCOUNTER — NON-APPOINTMENT (OUTPATIENT)
Age: 89
End: 2024-08-29

## 2024-08-29 ENCOUNTER — EMERGENCY (EMERGENCY)
Facility: HOSPITAL | Age: 88
LOS: 1 days | Discharge: HOME | End: 2024-08-29
Attending: STUDENT IN AN ORGANIZED HEALTH CARE EDUCATION/TRAINING PROGRAM | Admitting: STUDENT IN AN ORGANIZED HEALTH CARE EDUCATION/TRAINING PROGRAM
Payer: MEDICARE

## 2024-08-29 ENCOUNTER — APPOINTMENT (OUTPATIENT)
Dept: PULMONOLOGY | Facility: CLINIC | Age: 89
End: 2024-08-29

## 2024-08-29 VITALS
RESPIRATION RATE: 16 BRPM | DIASTOLIC BLOOD PRESSURE: 98 MMHG | HEART RATE: 104 BPM | HEIGHT: 71 IN | SYSTOLIC BLOOD PRESSURE: 158 MMHG | WEIGHT: 143.96 LBS | TEMPERATURE: 98 F | OXYGEN SATURATION: 96 %

## 2024-08-29 VITALS
SYSTOLIC BLOOD PRESSURE: 137 MMHG | OXYGEN SATURATION: 100 % | HEART RATE: 60 BPM | TEMPERATURE: 98 F | DIASTOLIC BLOOD PRESSURE: 81 MMHG | RESPIRATION RATE: 16 BRPM

## 2024-08-29 LAB
APTT BLD: 33.1 SEC — SIGNIFICANT CHANGE UP (ref 24.5–35.6)
BASOPHILS # BLD AUTO: 0.04 K/UL — SIGNIFICANT CHANGE UP (ref 0–0.2)
BASOPHILS NFR BLD AUTO: 0.6 % — SIGNIFICANT CHANGE UP (ref 0–2)
EOSINOPHIL # BLD AUTO: 0.3 K/UL — SIGNIFICANT CHANGE UP (ref 0–0.5)
EOSINOPHIL NFR BLD AUTO: 4.5 % — SIGNIFICANT CHANGE UP (ref 0–6)
HCT VFR BLD CALC: 38.9 % — LOW (ref 39–50)
HGB BLD-MCNC: 13.1 G/DL — SIGNIFICANT CHANGE UP (ref 13–17)
IANC: 3.54 K/UL — SIGNIFICANT CHANGE UP (ref 1.8–7.4)
IMM GRANULOCYTES NFR BLD AUTO: 0.9 % — SIGNIFICANT CHANGE UP (ref 0–0.9)
INR BLD: 1.5 RATIO — HIGH (ref 0.85–1.18)
LYMPHOCYTES # BLD AUTO: 1.71 K/UL — SIGNIFICANT CHANGE UP (ref 1–3.3)
LYMPHOCYTES # BLD AUTO: 25.4 % — SIGNIFICANT CHANGE UP (ref 13–44)
MCHC RBC-ENTMCNC: 31.2 PG — SIGNIFICANT CHANGE UP (ref 27–34)
MCHC RBC-ENTMCNC: 33.7 GM/DL — SIGNIFICANT CHANGE UP (ref 32–36)
MCV RBC AUTO: 92.6 FL — SIGNIFICANT CHANGE UP (ref 80–100)
MONOCYTES # BLD AUTO: 1.08 K/UL — HIGH (ref 0–0.9)
MONOCYTES NFR BLD AUTO: 16 % — HIGH (ref 2–14)
NEUTROPHILS # BLD AUTO: 3.54 K/UL — SIGNIFICANT CHANGE UP (ref 1.8–7.4)
NEUTROPHILS NFR BLD AUTO: 52.6 % — SIGNIFICANT CHANGE UP (ref 43–77)
NRBC # BLD AUTO: 0 K/UL — SIGNIFICANT CHANGE UP (ref 0–0)
NRBC # BLD: 0 /100 WBCS — SIGNIFICANT CHANGE UP (ref 0–0)
NRBC # FLD: 0 K/UL — SIGNIFICANT CHANGE UP (ref 0–0)
NRBC BLD-RTO: 0 /100 WBCS — SIGNIFICANT CHANGE UP (ref 0–0)
PLATELET # BLD AUTO: 177 K/UL — SIGNIFICANT CHANGE UP (ref 150–400)
PROTHROM AB SERPL-ACNC: 16.7 SEC — HIGH (ref 9.5–13)
RBC # BLD: 4.2 M/UL — SIGNIFICANT CHANGE UP (ref 4.2–5.8)
RBC # FLD: 13.7 % — SIGNIFICANT CHANGE UP (ref 10.3–14.5)
WBC # BLD: 6.73 K/UL — SIGNIFICANT CHANGE UP (ref 3.8–10.5)
WBC # FLD AUTO: 6.73 K/UL — SIGNIFICANT CHANGE UP (ref 3.8–10.5)

## 2024-08-29 PROCEDURE — 99284 EMERGENCY DEPT VISIT MOD MDM: CPT | Mod: FS

## 2024-08-30 LAB
CULTURE RESULTS: SIGNIFICANT CHANGE UP
CULTURE RESULTS: SIGNIFICANT CHANGE UP
SPECIMEN SOURCE: SIGNIFICANT CHANGE UP
SPECIMEN SOURCE: SIGNIFICANT CHANGE UP

## 2024-09-18 NOTE — PHYSICAL THERAPY INITIAL EVALUATION ADULT - ACTIVE RANGE OF MOTION EXAMINATION, REHAB EVAL
Thank you for allowing us to care for you at [facility] today. Thank you for your patience.     You have been seen in the ED today for an ankle injury.  You are stable to be discharged home, and follow up with orthopedics as an outpatient.    You have been provided a boot in the ED, please keep this on until you see the orthopedic specialist.  Elevated the region of injury if possible to reduce swelling   Take medications as prescribed    Please be sure to call the orthopedic specialist tomorrow to schedule further care.     Please return to the emergency department if you develop significant worsening of pain, numbness or tingling of the extremity, discoloration of the skin around the splint, fevers, or if you develop any other new or concerning symptoms as these could be signs of more serious medical illness.    We hope you feel better.      bilateral upper extremity Active ROM was WFL (within functional limits)/bilateral  lower extremity Active ROM was WFL (within functional limits)

## 2024-12-19 NOTE — PATIENT PROFILE ADULT. - NS PRO CONTRA FLU 1
Render Post-Care Instructions In Note?: no Post-Care Instructions: I reviewed with the patient in detail post-care instructions. Patient is to wear sunprotection, and avoid picking at any of the treated lesions. Pt may apply Vaseline to crusted or scabbing areas. Duration Of Freeze Thaw-Cycle (Seconds): 0 Total Number Of Aks Treated: 17 Detail Level: Zone Consent: The patient's consent was obtained including but not limited to risks of crusting, scabbing, blistering, scarring, darker or lighter pigmentary change, recurrence, incomplete removal and infection. yes

## 2025-01-17 ENCOUNTER — APPOINTMENT (OUTPATIENT)
Dept: ELECTROPHYSIOLOGY | Facility: CLINIC | Age: 89
End: 2025-01-17
Payer: MEDICARE

## 2025-01-17 ENCOUNTER — NON-APPOINTMENT (OUTPATIENT)
Age: 89
End: 2025-01-17

## 2025-01-17 VITALS
BODY MASS INDEX: 19.57 KG/M2 | TEMPERATURE: 97.4 F | OXYGEN SATURATION: 95 % | DIASTOLIC BLOOD PRESSURE: 85 MMHG | HEART RATE: 85 BPM | HEIGHT: 72 IN | SYSTOLIC BLOOD PRESSURE: 167 MMHG | WEIGHT: 144.5 LBS

## 2025-01-17 DIAGNOSIS — I48.20 CHRONIC ATRIAL FIBRILLATION, UNSP: ICD-10-CM

## 2025-01-17 DIAGNOSIS — I10 ESSENTIAL (PRIMARY) HYPERTENSION: ICD-10-CM

## 2025-01-17 DIAGNOSIS — E78.00 PURE HYPERCHOLESTEROLEMIA, UNSPECIFIED: ICD-10-CM

## 2025-01-17 PROCEDURE — 93000 ELECTROCARDIOGRAM COMPLETE: CPT

## 2025-01-17 PROCEDURE — 99213 OFFICE O/P EST LOW 20 MIN: CPT

## 2025-07-18 ENCOUNTER — APPOINTMENT (OUTPATIENT)
Dept: ELECTROPHYSIOLOGY | Facility: CLINIC | Age: 89
End: 2025-07-18
Payer: MEDICARE

## 2025-07-18 VITALS
HEIGHT: 72 IN | SYSTOLIC BLOOD PRESSURE: 163 MMHG | OXYGEN SATURATION: 95 % | BODY MASS INDEX: 19.5 KG/M2 | DIASTOLIC BLOOD PRESSURE: 103 MMHG | HEART RATE: 88 BPM | WEIGHT: 144 LBS

## 2025-07-18 PROCEDURE — 93000 ELECTROCARDIOGRAM COMPLETE: CPT

## 2025-07-18 PROCEDURE — 99214 OFFICE O/P EST MOD 30 MIN: CPT
